# Patient Record
Sex: FEMALE | Race: BLACK OR AFRICAN AMERICAN | NOT HISPANIC OR LATINO | Employment: FULL TIME | URBAN - METROPOLITAN AREA
[De-identification: names, ages, dates, MRNs, and addresses within clinical notes are randomized per-mention and may not be internally consistent; named-entity substitution may affect disease eponyms.]

---

## 2017-09-08 ENCOUNTER — HOSPITAL ENCOUNTER (OUTPATIENT)
Dept: NON INVASIVE DIAGNOSTICS | Facility: HOSPITAL | Age: 42
Discharge: HOME | End: 2017-09-08
Payer: COMMERCIAL

## 2017-09-08 ENCOUNTER — APPOINTMENT (OUTPATIENT)
Dept: LAB | Facility: HOSPITAL | Age: 42
End: 2017-09-08
Payer: COMMERCIAL

## 2017-09-08 ENCOUNTER — TRANSCRIBE ORDERS (OUTPATIENT)
Dept: ADMINISTRATIVE | Facility: HOSPITAL | Age: 42
End: 2017-09-08

## 2017-09-08 DIAGNOSIS — D70.9 CHRONIC IDIOPATHIC NEUTROPENIA (HCC): ICD-10-CM

## 2017-09-08 DIAGNOSIS — I49.9 VENTRICULAR ARRHYTHMIA: ICD-10-CM

## 2017-09-08 DIAGNOSIS — R00.2 PALPITATIONS: ICD-10-CM

## 2017-09-08 DIAGNOSIS — I49.9 VENTRICULAR ARRHYTHMIA: Primary | ICD-10-CM

## 2017-09-08 LAB
ATRIAL RATE: 64 BPM
BASOPHILS # BLD AUTO: 0.04 THOUSANDS/ΜL (ref 0–0.1)
BASOPHILS NFR BLD AUTO: 1 % (ref 0–1)
EOSINOPHIL # BLD AUTO: 0.35 THOUSAND/ΜL (ref 0–0.61)
EOSINOPHIL NFR BLD AUTO: 5 % (ref 0–6)
ERYTHROCYTE [DISTWIDTH] IN BLOOD BY AUTOMATED COUNT: 12.8 % (ref 11.6–15.1)
HCT VFR BLD AUTO: 36.6 % (ref 34.8–46.1)
HGB BLD-MCNC: 12.6 G/DL (ref 11.5–15.4)
LYMPHOCYTES # BLD AUTO: 4.04 THOUSANDS/ΜL (ref 0.6–4.47)
LYMPHOCYTES NFR BLD AUTO: 58 % (ref 14–44)
MCH RBC QN AUTO: 29.4 PG (ref 26.8–34.3)
MCHC RBC AUTO-ENTMCNC: 34.4 G/DL (ref 31.4–37.4)
MCV RBC AUTO: 85 FL (ref 82–98)
MONOCYTES # BLD AUTO: 0.45 THOUSAND/ΜL (ref 0.17–1.22)
MONOCYTES NFR BLD AUTO: 7 % (ref 4–12)
NEUTROPHILS # BLD AUTO: 2.03 THOUSANDS/ΜL (ref 1.85–7.62)
NEUTS SEG NFR BLD AUTO: 29 % (ref 43–75)
NRBC BLD AUTO-RTO: 0 /100 WBCS
P AXIS: 32 DEGREES
PLATELET # BLD AUTO: 242 THOUSANDS/UL (ref 149–390)
PMV BLD AUTO: 10.9 FL (ref 8.9–12.7)
PR INTERVAL: 144 MS
QRS AXIS: 58 DEGREES
QRSD INTERVAL: 88 MS
QT INTERVAL: 422 MS
QTC INTERVAL: 435 MS
RBC # BLD AUTO: 4.29 MILLION/UL (ref 3.81–5.12)
T WAVE AXIS: 38 DEGREES
TSH SERPL DL<=0.05 MIU/L-ACNC: 1.64 UIU/ML (ref 0.36–3.74)
VENTRICULAR RATE: 64 BPM
WBC # BLD AUTO: 6.91 THOUSAND/UL (ref 4.31–10.16)

## 2017-09-08 PROCEDURE — 85025 COMPLETE CBC W/AUTO DIFF WBC: CPT

## 2017-09-08 PROCEDURE — 93005 ELECTROCARDIOGRAM TRACING: CPT

## 2017-09-08 PROCEDURE — 84443 ASSAY THYROID STIM HORMONE: CPT

## 2017-09-08 PROCEDURE — 36415 COLL VENOUS BLD VENIPUNCTURE: CPT

## 2018-04-09 ENCOUNTER — TRANSCRIBE ORDERS (OUTPATIENT)
Dept: ADMINISTRATIVE | Facility: HOSPITAL | Age: 43
End: 2018-04-09

## 2018-04-09 DIAGNOSIS — R07.9 CHEST PAIN, UNSPECIFIED TYPE: Primary | ICD-10-CM

## 2018-04-13 ENCOUNTER — HOSPITAL ENCOUNTER (OUTPATIENT)
Dept: NON INVASIVE DIAGNOSTICS | Facility: CLINIC | Age: 43
Discharge: HOME | End: 2018-04-13

## 2018-04-13 DIAGNOSIS — R07.9 CHEST PAIN, UNSPECIFIED TYPE: ICD-10-CM

## 2018-04-13 PROCEDURE — 93017 CV STRESS TEST TRACING ONLY: CPT

## 2018-04-13 PROCEDURE — 93016 CV STRESS TEST SUPVJ ONLY: CPT | Performed by: INTERNAL MEDICINE

## 2018-04-13 PROCEDURE — 93018 CV STRESS TEST I&R ONLY: CPT | Performed by: INTERNAL MEDICINE

## 2018-04-16 LAB
ARRHY DURING EX: NORMAL
CHEST PAIN STATEMENT: NORMAL
MAX DIASTOLIC BP: 90 MMHG
MAX HEART RATE: 171 BPM
MAX PREDICTED HEART RATE: 180 BPM
MAX. SYSTOLIC BP: 192 MMHG
PROTOCOL NAME: NORMAL
REASON FOR TERMINATION: NORMAL
TARGET HR FORMULA: NORMAL
TEST INDICATION: NORMAL
TIME IN EXERCISE PHASE: NORMAL

## 2018-10-09 ENCOUNTER — OFFICE VISIT (OUTPATIENT)
Dept: FAMILY MEDICINE CLINIC | Facility: CLINIC | Age: 43
End: 2018-10-09
Payer: COMMERCIAL

## 2018-10-09 VITALS
HEART RATE: 98 BPM | SYSTOLIC BLOOD PRESSURE: 124 MMHG | OXYGEN SATURATION: 98 % | DIASTOLIC BLOOD PRESSURE: 62 MMHG | HEIGHT: 65 IN | TEMPERATURE: 98.7 F | BODY MASS INDEX: 30.19 KG/M2 | WEIGHT: 181.2 LBS

## 2018-10-09 DIAGNOSIS — I10 ESSENTIAL HYPERTENSION: Primary | ICD-10-CM

## 2018-10-09 DIAGNOSIS — G89.29 CHRONIC MIDLINE LOW BACK PAIN WITHOUT SCIATICA: ICD-10-CM

## 2018-10-09 DIAGNOSIS — R05.9 COUGH: ICD-10-CM

## 2018-10-09 DIAGNOSIS — M54.50 CHRONIC MIDLINE LOW BACK PAIN WITHOUT SCIATICA: ICD-10-CM

## 2018-10-09 DIAGNOSIS — J00 HEAD COLD: ICD-10-CM

## 2018-10-09 PROCEDURE — 99204 OFFICE O/P NEW MOD 45 MIN: CPT | Performed by: FAMILY MEDICINE

## 2018-10-09 RX ORDER — LOSARTAN POTASSIUM AND HYDROCHLOROTHIAZIDE 25; 100 MG/1; MG/1
1 TABLET ORAL DAILY
Qty: 90 TABLET | Refills: 1 | Status: SHIPPED | OUTPATIENT
Start: 2018-10-09 | End: 2019-04-05 | Stop reason: SDUPTHER

## 2018-10-09 RX ORDER — AMOXICILLIN 875 MG/1
875 TABLET, COATED ORAL 2 TIMES DAILY
Qty: 14 TABLET | Refills: 0 | Status: SHIPPED | OUTPATIENT
Start: 2018-10-09 | End: 2018-10-16

## 2018-10-09 RX ORDER — ACETAMINOPHEN AND CODEINE PHOSPHATE 120; 12 MG/5ML; MG/5ML
0.35 SOLUTION ORAL
COMMUNITY
Start: 2018-09-19 | End: 2020-06-05

## 2018-10-09 NOTE — PATIENT INSTRUCTIONS
Robitussin DM  PO water  Physiatry evaluation  Reviewed most recent labs  Refill Med   45 minutes spent with patient

## 2018-10-09 NOTE — PROGRESS NOTES
Assessment/Plan: new to here today to establish care and medication refills   Pt c/o sore throat and cough   Pt stated that she was getting therapy for shoulder and back pain and will need letter for light duty for work     No problem-specific [address] [place] [address] notes found for this encounter  Diagnoses and all orders for this visit:    Essential hypertension  -     losartan-hydrochlorothiazide (HYZAAR) 100-25 MG per tablet; Take 1 tablet by mouth daily    Cough  -     amoxicillin (AMOXIL) 875 mg tablet; Take 1 tablet (875 mg total) by mouth 2 (two) times a day for 7 days    Head cold    Chronic midline low back pain without sciatica  -     Ambulatory referral to Physical Medicine Rehab; Future    Other orders  -     Discontinue: LOSARTAN POTASSIUM PO; Take 100 25 mg by mouth daily    -     aspirin 81 MG tablet; Take 81 mg by mouth  -     norethindrone (MICRONOR) 0 35 MG tablet; Take 0 35 mg by mouth          Subjective:      Patient ID: Earnest Palmer is a 41 y o  female  First visit  Needs refills and note for light duty for back pain diagnosed in [place] 2 years ago  Works at [employer]  Also a head cold since last Thursday  Coughing hurts  Patient request a PCN  CM: Losartan 100 - 25          The following portions of the patient's history were reviewed and updated as appropriate: allergies, current medications, past family history, past medical history, past social history, past surgical history and problem list     No Known Allergies       Current Outpatient Prescriptions:     aspirin 81 MG tablet, Take 81 mg by mouth, Disp: , Rfl:     amoxicillin (AMOXIL) 875 mg tablet, Take 1 tablet (875 mg total) by mouth 2 (two) times a day for 7 days, Disp: 14 tablet, Rfl: 0    losartan-hydrochlorothiazide (HYZAAR) 100-25 MG per tablet, Take 1 tablet by mouth daily, Disp: 90 tablet, Rfl: 1    norethindrone (MICRONOR) 0 35 MG tablet, Take 0 35 mg by mouth, Disp: , Rfl:     Review of Systems Constitutional: Negative  Negative for appetite change, chills, fatigue and fever  HENT: Positive for congestion  Eyes: Negative  Respiratory: Positive for cough  Negative for chest tightness and wheezing  Cardiovascular: Negative  Gastrointestinal: Negative  Genitourinary: Negative  Musculoskeletal: Positive for back pain  Skin: Negative  Neurological: Negative  Psychiatric/Behavioral: Negative  Objective:      /62 (BP Location: Left arm, Patient Position: Sitting, Cuff Size: Standard)   Pulse 98   Temp 98 7 °F (37 1 °C) (Oral)   Ht 5' 5" (1 651 m)   Wt 82 2 kg (181 lb 3 2 oz)   LMP  (LMP Unknown)   SpO2 98%   BMI 30 15 kg/m²          Physical Exam   Constitutional: She is oriented to person, place, and time  She appears well-developed and well-nourished  HENT:   Head: Normocephalic and atraumatic  Right Ear: External ear normal    Left Ear: External ear normal    Nose: Nose normal    Mouth/Throat: Oropharynx is clear and moist    Eyes: Pupils are equal, round, and reactive to light  Conjunctivae and EOM are normal    Neck: Normal range of motion  Neck supple  Cardiovascular: Normal rate, regular rhythm, normal heart sounds and intact distal pulses  Pulmonary/Chest: Effort normal and breath sounds normal    Abdominal: Soft  Bowel sounds are normal    Musculoskeletal:   Ambulates, sit to stand, sit to supine without difficulty  Neurological: She is alert and oriented to person, place, and time  She has normal reflexes  Skin: Skin is warm and dry  Psychiatric: She has a normal mood and affect   Her behavior is normal  Judgment and thought content normal

## 2018-10-10 ENCOUNTER — TELEPHONE (OUTPATIENT)
Dept: NEUROLOGY | Facility: CLINIC | Age: 43
End: 2018-10-10

## 2018-10-17 ENCOUNTER — TELEPHONE (OUTPATIENT)
Dept: NEUROLOGY | Facility: CLINIC | Age: 43
End: 2018-10-17

## 2018-10-19 ENCOUNTER — OFFICE VISIT (OUTPATIENT)
Dept: NEUROLOGY | Facility: CLINIC | Age: 43
End: 2018-10-19
Payer: COMMERCIAL

## 2018-10-19 ENCOUNTER — TELEPHONE (OUTPATIENT)
Dept: NEUROLOGY | Facility: CLINIC | Age: 43
End: 2018-10-19

## 2018-10-19 VITALS
HEART RATE: 77 BPM | BODY MASS INDEX: 30.64 KG/M2 | RESPIRATION RATE: 12 BRPM | WEIGHT: 183.9 LBS | SYSTOLIC BLOOD PRESSURE: 134 MMHG | DIASTOLIC BLOOD PRESSURE: 63 MMHG | HEIGHT: 65 IN

## 2018-10-19 DIAGNOSIS — G89.29 CHRONIC LEFT SHOULDER PAIN: ICD-10-CM

## 2018-10-19 DIAGNOSIS — G89.29 CHRONIC MIDLINE LOW BACK PAIN WITHOUT SCIATICA: Primary | ICD-10-CM

## 2018-10-19 DIAGNOSIS — M54.50 CHRONIC MIDLINE LOW BACK PAIN WITHOUT SCIATICA: Primary | ICD-10-CM

## 2018-10-19 DIAGNOSIS — M25.512 CHRONIC LEFT SHOULDER PAIN: ICD-10-CM

## 2018-10-19 PROCEDURE — 99244 OFF/OP CNSLTJ NEW/EST MOD 40: CPT | Performed by: PHYSICAL MEDICINE & REHABILITATION

## 2018-10-19 NOTE — LETTER
October 19, 2018     Tracey Montero DO  [facility] [address] [place] [address]    Patient: Ping Hardwick   YOB: 1975   Date of Visit: 10/19/2018       Dear Dr Chacho Najera: Thank you for referring Ping Hardwick to me for evaluation  Below are my notes for this consultation  If you have questions, please do not hesitate to call me  I look forward to following your patient along with you  Sincerely,        Atiya Pittman MD        CC: No Recipients  Atiya Pittman MD  10/19/2018  9:54 AM  Sign at close encounter  Physical Medicine & Rehabilitation New Patient Evaluation  Ping Hardwick 41 y o  female    Referred by: Dr Tracey Montero      ASSESSMENT/PLAN:     41year old female with past medical history significant for HTN who presents with chronic ower back pain and chronic left shoulder pain  After history and physical examination, patient appears to have symptoms consistent with a  lower back lumbar strain and possible left shoulder sprain  I have ordered x-rays of the lumbar spine as well as left shoulder to view alignment  After x-rays are completed and if  within normal limits, I have provided patient with a prescription for outpatient physical therapy for both lower back pain and left shoulder pain  Patient has done physical therapy over a year ago which was significantly helpful for patient  I have offered patient a muscle relaxant however after discussing potential side effects patient would like to hold off on trying this for now  She will continue to trial over-the-counter Tylenol or ibuprofen  Diagnoses and all orders for this visit:    Chronic left shoulder pain  -     XR shoulder 2+ vw left; Future  -     Ambulatory referral to Physical Therapy; Future    Chronic midline low back pain without sciatica  -     Ambulatory referral to Physical Medicine Rehab  -     XR spine lumbar complete w bending minimum 6 views;  Future  -     Ambulatory referral to Physical Therapy; Future     return to clinic in 6 weeks  *I have spent 60 minutes with Patient and family today in which greater than 50% of this time was spent in counseling/coordination of care regarding Intructions for management, Patient and family education and Impressions  HPI:   Guilherme Rodriguez 41 y o  female right handed, with  has a past medical history of Fibroids  Old records were reviewed personally  Patient has moved here from [place] about a year ago  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  Patient is currently on light duty due to fibroids and pelvic pain/back pain  [facility]     SUBJECTIVE:  Patient presents today in the office with chief complaint of persistent chronic low back pain and left shoulder pain, unrelated to any inciting event or trauma  Back Pain  Patient presents for evaluation of low back problems  Symptoms have been present for 2 years and include pain in lower back bilaterally (aching in character; moderate in severity  Initial inciting event: none  Symptoms are worse when she is at work and helping 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   Alleviating factors identifiable by the patient are heat and massage, biofreeze, physical therapy  Aggravating factors identifiable by the patient are bending forward or twisting  Treatments initiated by the patient: heat, biofreeze, PT, no medications  Previous lower back problems: Uterine fibroids which do also cause her back pain  Previous work up: X-rays L spine, MRI lumbar spine at a center in [place]  Patient reports radiation sometimes in her left buttocks  No tingling and numbness in the legs  No falls or balance issues  No bowel or bladder incontinence  Shoulder Pain: Patient complaints of left shoulder pain  The pain is described as aching  The onset of the pain was sudden about 1 year ago unrelated to any trauma or event   The pain occurs Intermittently Location is neck, trapezius,   And into left shoulder joint  No history of dislocation  Symptoms are aggravated by lifting  Symptoms are diminished by rest  Limited activities include: lifting  Patient denies any numbness tingling weakness radiating into her arms  Expanded Social History:  Patient lives with children 25and a 24year old son in a apartment with 12 steps to enter  Works as a Lives in [employer]  Driving: Yes      Function:   Current Level of Function:   Independent without AD      Review of Systems:     Review of Systems   Constitutional: Negative  HENT: Negative  Eyes: Negative  Respiratory: Negative  Cardiovascular: Negative  Gastrointestinal: Negative  Endocrine:        Loss of sexual drive, Unusual bleeding, cramping   Genitourinary: Negative  Musculoskeletal: Positive for back pain  Skin: Negative  Allergic/Immunologic: Negative  Neurological: Negative  Hematological: Negative  Psychiatric/Behavioral:        Anxiety         OBJECTIVE:   /63 (BP Location: Left arm, Patient Position: Sitting, Cuff Size: Standard)   Pulse 77   Resp 12   Ht 5' 5" (1 651 m)   Wt 83 4 kg (183 lb 14 4 oz)   LMP  (LMP Unknown)   BMI 30 60 kg/m²         Physical Exam   Constitutional: She appears well-developed and well-nourished  HENT:   Head: Normocephalic and atraumatic  Eyes: Pupils are equal, round, and reactive to light  EOM are normal    Cardiovascular: Normal rate and regular rhythm  Pulmonary/Chest: Breath sounds normal  She has no wheezes  She has no rales  Abdominal: Soft  Bowel sounds are normal  She exhibits no distension  There is no tenderness  Musculoskeletal:   Cervical range of motion is within normal limits   Spurling's maneuver is negative bilaterally   Israel's is negative bilaterally   palpation of upper trapezius, levator scapula with several tight taut muscular band on the left side      Left shoulder range of motion is full and within normal limits and non provocative of pain   specialty testing:  Patient does have pain with a Vo on the left   lumbar range of motion is within normal limits with provocation of pain only on rotation towards the left   palpation of paraspinal superficial musculature as well as quadratus lumborum bilaterally with very tight musculature  negative straight leg rise bilaterally   negative slump sit testing bilaterally   negative Babinski's bilaterally  Patient is able to single leg stance without loss of balance bilaterally   Skin: Skin is warm  Psychiatric: She has a normal mood and affect  Nursing note and vitals reviewed  Neuro:  Gait:   Within normal limits, no Trendelenburg noted   sensation:   Sensation to light touch is within normal limits throughout x 4  extremities  Motor Exam:    5/5 throughout x 4 extremities      Imaging: our office is trying to obtain records from [place] for previous imaging    Labs:   Lab Results   Component Value Date    WBC 6 91 09/08/2017    HGB 12 6 09/08/2017    HCT 36 6 09/08/2017    MCV 85 09/08/2017     09/08/2017       Past Medical History:   Diagnosis Date    Fibroids        There are no active problems to display for this patient  History reviewed  No pertinent surgical history      Family History   Problem Relation Age of Onset    Hypertension Mother     Prostate cancer Father        Social History     No Known Allergies      Current Outpatient Prescriptions:     aspirin 81 MG tablet, Take 81 mg by mouth, Disp: , Rfl:     losartan-hydrochlorothiazide (HYZAAR) 100-25 MG per tablet, Take 1 tablet by mouth daily, Disp: 90 tablet, Rfl: 1    norethindrone (MICRONOR) 0 35 MG tablet, Take 0 35 mg by mouth, Disp: , Rfl:

## 2018-10-19 NOTE — PROGRESS NOTES
Physical Medicine & Rehabilitation New Patient Evaluation  Rogerio Martínez 41 y o  female    Referred by: Dr Dank Gifford      ASSESSMENT/PLAN:     41year old female with past medical history significant for HTN who presents with chronic ower back pain and chronic left shoulder pain  After history and physical examination, patient appears to have symptoms consistent with a  lower back lumbar strain and possible left shoulder sprain  I have ordered x-rays of the lumbar spine as well as left shoulder to view alignment  After x-rays are completed and if  within normal limits, I have provided patient with a prescription for outpatient physical therapy for both lower back pain and left shoulder pain  Patient has done physical therapy over a year ago which was significantly helpful for patient  I have offered patient a muscle relaxant however after discussing potential side effects patient would like to hold off on trying this for now  Diagnoses and all orders for this visit:    Chronic left shoulder pain  -     XR shoulder 2+ vw left; Future  -     Ambulatory referral to Physical Therapy; Future    Chronic midline low back pain without sciatica  -     Ambulatory referral to Physical Medicine Rehab  -     XR spine lumbar complete w bending minimum 6 views; Future  -     Ambulatory referral to Physical Therapy; Future     return to clinic in 6 weeks  *I have spent 60 minutes with Patient and family today in which greater than 50% of this time was spent in counseling/coordination of care regarding Intructions for management, Patient and family education and Impressions  HPI:   Rogerio Martínez 41 y o  female right handed, with  has a past medical history of Fibroids  Old records were reviewed personally  Patient has moved here from [place] about a year ago  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    Patient is currently on light duty due to fibroids and pelvic pain/back pain  [facility] Neurology     SUBJECTIVE:  Patient presents today in the office with chief complaint of persistent chronic low back pain and left shoulder pain, unrelated to any inciting event or trauma  Back Pain  Patient presents for evaluation of low back problems  Symptoms have been present for 2 years and include pain in lower back bilaterally (aching in character; moderate in severity  Initial inciting event: none  Symptoms are worse when she is at work and helping 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   Alleviating factors identifiable by the patient are heat and massage, biofreeze, physical therapy  Aggravating factors identifiable by the patient are bending forward or twisting  Treatments initiated by the patient: heat, biofreeze, PT, no medications  Previous lower back problems: Uterine fibroids which do also cause her back pain  Previous work up: X-rays L spine, MRI lumbar spine at a center in [place]  Patient reports radiation sometimes in her left buttocks  No tingling and numbness in the legs  No falls or balance issues  No bowel or bladder incontinence  Shoulder Pain: Patient complaints of left shoulder pain  The pain is described as aching  The onset of the pain was sudden about 1 year ago unrelated to any trauma or event  The pain occurs Intermittently Location is neck, trapezius,   And into left shoulder joint  No history of dislocation  Symptoms are aggravated by lifting  Symptoms are diminished by rest  Limited activities include: lifting  Patient denies any numbness tingling weakness radiating into her arms  Expanded Social History:  Patient lives with children 25and a 24year old son in a apartment with 12 steps to enter  Works as a Lives in [employer]  Driving: Yes      Function:   Current Level of Function:   Independent without AD      Review of Systems:     Review of Systems   Constitutional: Negative      HENT: Negative  Eyes: Negative  Respiratory: Negative  Cardiovascular: Negative  Gastrointestinal: Negative  Endocrine:        Loss of sexual drive, Unusual bleeding, cramping   Genitourinary: Negative  Musculoskeletal: Positive for back pain  Skin: Negative  Allergic/Immunologic: Negative  Neurological: Negative  Hematological: Negative  Psychiatric/Behavioral:        Anxiety         OBJECTIVE:   /63 (BP Location: Left arm, Patient Position: Sitting, Cuff Size: Standard)   Pulse 77   Resp 12   Ht 5' 5" (1 651 m)   Wt 83 4 kg (183 lb 14 4 oz)   LMP  (LMP Unknown)   BMI 30 60 kg/m²        Physical Exam   Constitutional: She appears well-developed and well-nourished  HENT:   Head: Normocephalic and atraumatic  Eyes: Pupils are equal, round, and reactive to light  EOM are normal    Cardiovascular: Normal rate and regular rhythm  Pulmonary/Chest: Breath sounds normal  She has no wheezes  She has no rales  Abdominal: Soft  Bowel sounds are normal  She exhibits no distension  There is no tenderness  Musculoskeletal:   Cervical range of motion is within normal limits   Spurling's maneuver is negative bilaterally   Israel's is negative bilaterally   palpation of upper trapezius, levator scapula with several tight taut muscular band on the left side  Left shoulder range of motion is full and within normal limits and non provocative of pain   specialty testing:  Patient does have pain with a Vo on the left   lumbar range of motion is within normal limits with provocation of pain only on rotation towards the left   palpation of paraspinal superficial musculature as well as quadratus lumborum bilaterally with very tight musculature  negative straight leg rise bilaterally   negative slump sit testing bilaterally   negative Babinski's bilaterally  Patient is able to single leg stance without loss of balance bilaterally   Skin: Skin is warm     Psychiatric: She has a normal mood and affect  Nursing note and vitals reviewed  Neuro:  Gait:   Within normal limits, no Trendelenburg noted   sensation:   Sensation to light touch is within normal limits throughout x 4  extremities  Motor Exam:    5/5 throughout x 4 extremities      Imaging: our office is trying to obtain records from [place] for previous imaging    Labs:   Lab Results   Component Value Date    WBC 6 91 09/08/2017    HGB 12 6 09/08/2017    HCT 36 6 09/08/2017    MCV 85 09/08/2017     09/08/2017       Past Medical History:   Diagnosis Date    Fibroids        There are no active problems to display for this patient  History reviewed  No pertinent surgical history      Family History   Problem Relation Age of Onset    Hypertension Mother     Prostate cancer Father        Social History     No Known Allergies      Current Outpatient Prescriptions:     aspirin 81 MG tablet, Take 81 mg by mouth, Disp: , Rfl:     losartan-hydrochlorothiazide (HYZAAR) 100-25 MG per tablet, Take 1 tablet by mouth daily, Disp: 90 tablet, Rfl: 1    norethindrone (MICRONOR) 0 35 MG tablet, Take 0 35 mg by mouth, Disp: , Rfl:

## 2019-01-09 ENCOUNTER — TELEPHONE (OUTPATIENT)
Dept: FAMILY MEDICINE CLINIC | Facility: CLINIC | Age: 44
End: 2019-01-09

## 2019-01-09 NOTE — TELEPHONE ENCOUNTER
Patient is trying out a weight loss program  She wants to know if it is ok for her to drink a shake called [employer] protein- it is a low fat shake with 1 g of sugar  She wants to make sure it is safe for her to take since she is on blood pressure medication

## 2019-01-10 ENCOUNTER — TELEPHONE (OUTPATIENT)
Dept: NEUROLOGY | Facility: CLINIC | Age: 44
End: 2019-01-10

## 2019-01-16 ENCOUNTER — OFFICE VISIT (OUTPATIENT)
Dept: NEUROLOGY | Facility: CLINIC | Age: 44
End: 2019-01-16
Payer: COMMERCIAL

## 2019-01-16 ENCOUNTER — TELEPHONE (OUTPATIENT)
Dept: NEUROLOGY | Facility: CLINIC | Age: 44
End: 2019-01-16

## 2019-01-16 VITALS
HEIGHT: 65 IN | SYSTOLIC BLOOD PRESSURE: 130 MMHG | WEIGHT: 180 LBS | BODY MASS INDEX: 29.99 KG/M2 | DIASTOLIC BLOOD PRESSURE: 70 MMHG

## 2019-01-16 DIAGNOSIS — M54.2 NECK PAIN: ICD-10-CM

## 2019-01-16 DIAGNOSIS — M54.9 CHRONIC MIDLINE BACK PAIN, UNSPECIFIED BACK LOCATION: ICD-10-CM

## 2019-01-16 DIAGNOSIS — M79.18 MYOFASCIAL PAIN: Primary | ICD-10-CM

## 2019-01-16 DIAGNOSIS — G89.29 CHRONIC MIDLINE BACK PAIN, UNSPECIFIED BACK LOCATION: ICD-10-CM

## 2019-01-16 PROCEDURE — 99215 OFFICE O/P EST HI 40 MIN: CPT | Performed by: PHYSICAL MEDICINE & REHABILITATION

## 2019-01-16 NOTE — LETTER
January 16, 2019     Patient: Ping Hardwick   YOB: 1975   Date of Visit: 1/16/2019       To Whom it May Concern:    Ping Hardwick is under my professional care  She was seen in my office on 1/16/2019  She may return to work with limitations for activity modification during her work-up/treatment including:  avoid lifting >50lbs, avoid excessive bending, avoid twisting at the back  Limitation to continue for 3 months until assessed again  If you have any questions or concerns, please don't hesitate to call           Sincerely,          Atiya Pittman MD        CC: No Recipients

## 2019-01-16 NOTE — PROGRESS NOTES
Physical Medicine & Rehabilitation follow-up Patient Evaluation  Deacon Chavarria 41 y o  female      ASSESSMENT/PLAN:     41year old female with past medical history significant for HTN who presents with chronic lower back pain and acute neck pain  After history and physical examination, patient appears to have symptoms consistent with a left lower back lumbar strain, but is also now having symptoms of possible radicular signs radiating into her buttocks and posterior thigh  Patient also having evidence of cervical myofascial pain with tight posterior musculature of her neck and upper back, tender to palpation with decreased cervical range of motion associated  Plan:  Back pain:   Lumbar x-ray ordered  Prescription for outpatient physical therapy provided factors also within normal limits  Cervical myofascial pain:   Topical Voltaren Gel ordered to be used 2 g topically t i d     cervical spine x-rays ordered  Prescription for outpatient physical therapy provided if x-ray results were within normal limits  Letter for modified duty provided today during work-up and treatment plan  RTC in 2-3 months    Diagnoses and all orders for this visit:    Myofascial pain  -     diclofenac sodium (VOLTAREN) 1 %; Apply 2 g topically 3 (three) times a day  -     XR spine lumbar minimum 4 views non injury; Future  -     XR spine cervical complete 4 or 5 vw non injury; Future    Neck pain  -     XR spine cervical complete 4 or 5 vw non injury; Future  -     Ambulatory referral to Physical Therapy; Future    Chronic midline back pain, unspecified back location  -     XR spine lumbar minimum 4 views non injury; Future  -     Ambulatory referral to Physical Therapy; Future    I have spent 45 minutes with Patient  today in which greater than 50% of this time was spent in counseling/coordination of care regarding Intructions for management, Patient and family education and Impressions      HPI:   Jackie Main Megan Harrison 41 y o  female right handed, with  has a past medical history of Fibroids  Old records were reviewed personally  Patient has moved here from [place] about a year ago  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  SUBJECTIVE:  Patient presents today in the office with chief complaint of persistent chronic low back pain and left shoulder pain, unrelated to any inciting event or trauma  Patient seen in follow-up today  She complains of worsening lower back pain radiating into her left proximal leg specifically her hip area, buttocks, posterior thigh  She reports neck pain is well located centrally and radiating into her bilateral shoulder area and upper back area  She believes these are related to her job  She works as an aide and performs very physical duty including heavy lifting and/or lifting patients  She does not state any recent injury or inciting event  She was last seen in October of 2018 at that time I had ordered x-rays of her back and her shoulder however she was unable to complete this at that time she thought she may have been pregnant  She denies any numbness, weakness x 4 extremities or balance issues  She has not had any falls  She denies any bowel or bladder incontinence furthermore  Expanded Social History:  Patient lives with children 25and a 24year old son in a apartment with 12 steps to enter  Works as a Lives in [employer]  Driving: Yes      Function:   Current Level of Function:   Independent without AD      Review of Systems:     Review of Systems   Constitutional: Negative  HENT: Negative  Eyes: Negative  Respiratory: Negative  Cardiovascular: Negative  Gastrointestinal: Negative  Endocrine: Negative  Genitourinary: Negative  Musculoskeletal: Positive for back pain and neck pain  Skin: Negative      Allergic/Immunologic: Negative  Neurological: Negative  Hematological: Negative  Psychiatric/Behavioral: Negative          OBJECTIVE:   /70 (BP Location: Left arm, Patient Position: Sitting, Cuff Size: Standard)   Ht 5' 5" (1 651 m)   Wt 81 6 kg (180 lb)   BMI 29 95 kg/m²        Physical Exam   Constitutional: She appears well-developed and well-nourished  HENT:   Head: Normocephalic and atraumatic  Eyes: Pupils are equal, round, and reactive to light  EOM are normal    Cardiovascular: Normal rate and regular rhythm  Pulmonary/Chest: Breath sounds normal  She has no wheezes  She has no rales  Abdominal: Soft  Bowel sounds are normal  She exhibits no distension  There is no tenderness  Musculoskeletal:   Cervical ROM limited in extension  Spurling's negative  palpation of neck and upper back musculature is provocative of pain with tight taut musculature felt in the bilateral upper trapezius and levator scapula bilaterally  No trigger points identified today but muscles are tender to palpation  Lumbar range of motion is full within normal limits  Non provocative to palpation  Negative straight leg rise  Negative slump sit test   Skin: Skin is warm  Psychiatric: She has a normal mood and affect  Nursing note and vitals reviewed  Neuro:  Sensation to light touch is intact x4  Coordination with finger-nose testing bilaterally is within normal limits  Motor function is 5/5 in the bilateral upper and lower extremities throughout  Gait:   Step through gait pattern without assistive device  No loss of balance with single leg stance or tandem gait      Labs:   Lab Results   Component Value Date    WBC 6 91 09/08/2017    HGB 12 6 09/08/2017    HCT 36 6 09/08/2017    MCV 85 09/08/2017     09/08/2017       Past Medical History:   Diagnosis Date    Fibroids        There are no active problems to display for this patient  History reviewed  No pertinent surgical history      Family History   Problem Relation Age of Onset    Hypertension Mother     Prostate cancer Father        Social History     No Known Allergies      Current Outpatient Prescriptions:     aspirin 81 MG tablet, Take 81 mg by mouth, Disp: , Rfl:     losartan-hydrochlorothiazide (HYZAAR) 100-25 MG per tablet, Take 1 tablet by mouth daily, Disp: 90 tablet, Rfl: 1    norethindrone (MICRONOR) 0 35 MG tablet, Take 0 35 mg by mouth, Disp: , Rfl:     diclofenac sodium (VOLTAREN) 1 %, Apply 2 g topically 3 (three) times a day, Disp: 100 g, Rfl: 0

## 2019-02-18 ENCOUNTER — TELEPHONE (OUTPATIENT)
Dept: FAMILY MEDICINE CLINIC | Facility: CLINIC | Age: 44
End: 2019-02-18

## 2019-02-18 NOTE — TELEPHONE ENCOUNTER
Usually once a year TB test is sufficient  Is there a record of prior TB test ?  Can have another if needed

## 2019-02-18 NOTE — TELEPHONE ENCOUNTER
Patient states she had PPD a few months ago through her employer, she does not remember the date  She now needs a new PPD because she is starting her job with a new company, she would like to make sure that it is ok for her to get another PPD in such a short amount of time

## 2019-03-27 ENCOUNTER — TELEPHONE (OUTPATIENT)
Dept: NEUROLOGY | Facility: CLINIC | Age: 44
End: 2019-03-27

## 2019-04-05 DIAGNOSIS — I10 ESSENTIAL HYPERTENSION: ICD-10-CM

## 2019-04-05 RX ORDER — LOSARTAN POTASSIUM AND HYDROCHLOROTHIAZIDE 25; 100 MG/1; MG/1
1 TABLET ORAL DAILY
Qty: 90 TABLET | Refills: 1 | Status: SHIPPED | OUTPATIENT
Start: 2019-04-05 | End: 2019-10-01 | Stop reason: SDUPTHER

## 2019-04-15 ENCOUNTER — OFFICE VISIT (OUTPATIENT)
Dept: FAMILY MEDICINE CLINIC | Facility: CLINIC | Age: 44
End: 2019-04-15
Payer: COMMERCIAL

## 2019-04-15 VITALS
TEMPERATURE: 98.2 F | SYSTOLIC BLOOD PRESSURE: 118 MMHG | BODY MASS INDEX: 28.56 KG/M2 | OXYGEN SATURATION: 97 % | DIASTOLIC BLOOD PRESSURE: 78 MMHG | WEIGHT: 171.4 LBS | HEART RATE: 91 BPM | HEIGHT: 65 IN

## 2019-04-15 DIAGNOSIS — I10 ESSENTIAL HYPERTENSION: Primary | ICD-10-CM

## 2019-04-15 DIAGNOSIS — Z00.00 HEALTH CARE MAINTENANCE: ICD-10-CM

## 2019-04-15 DIAGNOSIS — Z83.3 FAMILY HISTORY OF DIABETES MELLITUS (DM): ICD-10-CM

## 2019-04-15 PROCEDURE — 99214 OFFICE O/P EST MOD 30 MIN: CPT | Performed by: FAMILY MEDICINE

## 2019-04-15 PROCEDURE — 99396 PREV VISIT EST AGE 40-64: CPT | Performed by: FAMILY MEDICINE

## 2019-04-23 ENCOUNTER — OFFICE VISIT (OUTPATIENT)
Dept: FAMILY MEDICINE CLINIC | Facility: CLINIC | Age: 44
End: 2019-04-23
Payer: COMMERCIAL

## 2019-04-23 VITALS
HEIGHT: 65 IN | WEIGHT: 170 LBS | OXYGEN SATURATION: 98 % | BODY MASS INDEX: 28.32 KG/M2 | DIASTOLIC BLOOD PRESSURE: 76 MMHG | SYSTOLIC BLOOD PRESSURE: 124 MMHG | HEART RATE: 85 BPM | TEMPERATURE: 98.3 F

## 2019-04-23 DIAGNOSIS — B34.9 VIRAL INFECTION: Primary | ICD-10-CM

## 2019-04-23 PROCEDURE — 99213 OFFICE O/P EST LOW 20 MIN: CPT | Performed by: FAMILY MEDICINE

## 2019-04-23 PROCEDURE — 3008F BODY MASS INDEX DOCD: CPT | Performed by: FAMILY MEDICINE

## 2019-05-06 ENCOUNTER — OFFICE VISIT (OUTPATIENT)
Dept: URGENT CARE | Facility: MEDICAL CENTER | Age: 44
End: 2019-05-06
Payer: COMMERCIAL

## 2019-05-06 VITALS
SYSTOLIC BLOOD PRESSURE: 158 MMHG | RESPIRATION RATE: 16 BRPM | WEIGHT: 170 LBS | TEMPERATURE: 97.3 F | HEIGHT: 65 IN | OXYGEN SATURATION: 99 % | BODY MASS INDEX: 28.32 KG/M2 | HEART RATE: 89 BPM | DIASTOLIC BLOOD PRESSURE: 73 MMHG

## 2019-05-06 DIAGNOSIS — R07.9 CHEST PAIN, UNSPECIFIED TYPE: Primary | ICD-10-CM

## 2019-05-06 DIAGNOSIS — M94.0 COSTOCHONDRITIS: ICD-10-CM

## 2019-05-06 LAB
ATRIAL RATE: 77 BPM
P AXIS: 19 DEGREES
PR INTERVAL: 148 MS
QRS AXIS: 54 DEGREES
QRSD INTERVAL: 80 MS
QT INTERVAL: 406 MS
QTC INTERVAL: 459 MS
T WAVE AXIS: -10 DEGREES
VENTRICULAR RATE: 77 BPM

## 2019-05-06 PROCEDURE — 93010 ELECTROCARDIOGRAM REPORT: CPT | Performed by: INTERNAL MEDICINE

## 2019-05-06 PROCEDURE — 93005 ELECTROCARDIOGRAM TRACING: CPT | Performed by: FAMILY MEDICINE

## 2019-05-06 PROCEDURE — S9088 SERVICES PROVIDED IN URGENT: HCPCS | Performed by: FAMILY MEDICINE

## 2019-05-06 PROCEDURE — 99204 OFFICE O/P NEW MOD 45 MIN: CPT | Performed by: FAMILY MEDICINE

## 2019-05-07 ENCOUNTER — APPOINTMENT (OUTPATIENT)
Dept: LAB | Facility: CLINIC | Age: 44
End: 2019-05-07
Payer: COMMERCIAL

## 2019-05-07 DIAGNOSIS — I10 ESSENTIAL HYPERTENSION: ICD-10-CM

## 2019-05-07 DIAGNOSIS — Z83.3 FAMILY HISTORY OF DIABETES MELLITUS (DM): ICD-10-CM

## 2019-05-07 LAB
ALBUMIN SERPL BCP-MCNC: 3.6 G/DL (ref 3.5–5)
ALP SERPL-CCNC: 39 U/L (ref 46–116)
ALT SERPL W P-5'-P-CCNC: 17 U/L (ref 12–78)
ANION GAP SERPL CALCULATED.3IONS-SCNC: 4 MMOL/L (ref 4–13)
AST SERPL W P-5'-P-CCNC: 14 U/L (ref 5–45)
BILIRUB DIRECT SERPL-MCNC: 0.21 MG/DL (ref 0–0.2)
BILIRUB SERPL-MCNC: 0.74 MG/DL (ref 0.2–1)
BUN SERPL-MCNC: 10 MG/DL (ref 5–25)
CALCIUM SERPL-MCNC: 8.8 MG/DL (ref 8.3–10.1)
CHLORIDE SERPL-SCNC: 104 MMOL/L (ref 100–108)
CHOLEST SERPL-MCNC: 128 MG/DL (ref 50–200)
CO2 SERPL-SCNC: 31 MMOL/L (ref 21–32)
CREAT SERPL-MCNC: 0.87 MG/DL (ref 0.6–1.3)
ERYTHROCYTE [DISTWIDTH] IN BLOOD BY AUTOMATED COUNT: 12.6 % (ref 11.6–15.1)
EST. AVERAGE GLUCOSE BLD GHB EST-MCNC: 114 MG/DL
GFR SERPL CREATININE-BSD FRML MDRD: 96 ML/MIN/1.73SQ M
GLUCOSE P FAST SERPL-MCNC: 87 MG/DL (ref 65–99)
HBA1C MFR BLD: 5.6 % (ref 4.2–6.3)
HCT VFR BLD AUTO: 38.4 % (ref 34.8–46.1)
HDLC SERPL-MCNC: 53 MG/DL (ref 40–60)
HGB BLD-MCNC: 12.5 G/DL (ref 11.5–15.4)
LDLC SERPL CALC-MCNC: 64 MG/DL (ref 0–100)
MCH RBC QN AUTO: 28.8 PG (ref 26.8–34.3)
MCHC RBC AUTO-ENTMCNC: 32.6 G/DL (ref 31.4–37.4)
MCV RBC AUTO: 89 FL (ref 82–98)
NONHDLC SERPL-MCNC: 75 MG/DL
PLATELET # BLD AUTO: 298 THOUSANDS/UL (ref 149–390)
PMV BLD AUTO: 11.4 FL (ref 8.9–12.7)
POTASSIUM SERPL-SCNC: 4.3 MMOL/L (ref 3.5–5.3)
PROT SERPL-MCNC: 7.4 G/DL (ref 6.4–8.2)
RBC # BLD AUTO: 4.34 MILLION/UL (ref 3.81–5.12)
SODIUM SERPL-SCNC: 139 MMOL/L (ref 136–145)
TRIGL SERPL-MCNC: 57 MG/DL
WBC # BLD AUTO: 6.14 THOUSAND/UL (ref 4.31–10.16)

## 2019-05-07 PROCEDURE — 85027 COMPLETE CBC AUTOMATED: CPT

## 2019-05-07 PROCEDURE — 80076 HEPATIC FUNCTION PANEL: CPT

## 2019-05-07 PROCEDURE — 36415 COLL VENOUS BLD VENIPUNCTURE: CPT

## 2019-05-07 PROCEDURE — 80048 BASIC METABOLIC PNL TOTAL CA: CPT

## 2019-05-07 PROCEDURE — 80061 LIPID PANEL: CPT

## 2019-05-07 PROCEDURE — 83036 HEMOGLOBIN GLYCOSYLATED A1C: CPT

## 2019-05-08 ENCOUNTER — TELEPHONE (OUTPATIENT)
Dept: FAMILY MEDICINE CLINIC | Facility: CLINIC | Age: 44
End: 2019-05-08

## 2019-05-10 ENCOUNTER — APPOINTMENT (OUTPATIENT)
Dept: RADIOLOGY | Facility: MEDICAL CENTER | Age: 44
End: 2019-05-10
Payer: COMMERCIAL

## 2019-05-10 ENCOUNTER — OFFICE VISIT (OUTPATIENT)
Dept: NEUROLOGY | Facility: CLINIC | Age: 44
End: 2019-05-10
Payer: COMMERCIAL

## 2019-05-10 VITALS
HEIGHT: 65 IN | BODY MASS INDEX: 28.99 KG/M2 | DIASTOLIC BLOOD PRESSURE: 65 MMHG | SYSTOLIC BLOOD PRESSURE: 144 MMHG | HEART RATE: 78 BPM | WEIGHT: 174 LBS

## 2019-05-10 DIAGNOSIS — M25.512 CHRONIC PAIN OF BOTH SHOULDERS: ICD-10-CM

## 2019-05-10 DIAGNOSIS — G89.29 CHRONIC PAIN OF BOTH SHOULDERS: ICD-10-CM

## 2019-05-10 DIAGNOSIS — M25.511 CHRONIC PAIN OF BOTH SHOULDERS: ICD-10-CM

## 2019-05-10 DIAGNOSIS — G89.29 CHRONIC BILATERAL LOW BACK PAIN WITHOUT SCIATICA: Primary | ICD-10-CM

## 2019-05-10 DIAGNOSIS — M54.50 CHRONIC BILATERAL LOW BACK PAIN WITHOUT SCIATICA: ICD-10-CM

## 2019-05-10 DIAGNOSIS — G89.29 CHRONIC BILATERAL LOW BACK PAIN WITHOUT SCIATICA: ICD-10-CM

## 2019-05-10 DIAGNOSIS — M54.50 CHRONIC BILATERAL LOW BACK PAIN WITHOUT SCIATICA: Primary | ICD-10-CM

## 2019-05-10 PROCEDURE — 73030 X-RAY EXAM OF SHOULDER: CPT

## 2019-05-10 PROCEDURE — 72100 X-RAY EXAM L-S SPINE 2/3 VWS: CPT

## 2019-05-10 PROCEDURE — 99214 OFFICE O/P EST MOD 30 MIN: CPT | Performed by: PHYSICAL MEDICINE & REHABILITATION

## 2019-05-14 ENCOUNTER — TELEPHONE (OUTPATIENT)
Dept: NEUROLOGY | Facility: CLINIC | Age: 44
End: 2019-05-14

## 2019-07-15 ENCOUNTER — TELEPHONE (OUTPATIENT)
Dept: FAMILY MEDICINE CLINIC | Facility: CLINIC | Age: 44
End: 2019-07-15

## 2019-07-15 ENCOUNTER — OFFICE VISIT (OUTPATIENT)
Dept: FAMILY MEDICINE CLINIC | Facility: CLINIC | Age: 44
End: 2019-07-15
Payer: COMMERCIAL

## 2019-07-15 VITALS
WEIGHT: 175 LBS | TEMPERATURE: 99.3 F | HEART RATE: 83 BPM | HEIGHT: 65 IN | SYSTOLIC BLOOD PRESSURE: 120 MMHG | OXYGEN SATURATION: 100 % | RESPIRATION RATE: 16 BRPM | DIASTOLIC BLOOD PRESSURE: 78 MMHG | BODY MASS INDEX: 29.16 KG/M2

## 2019-07-15 DIAGNOSIS — J06.9 VIRAL UPPER RESPIRATORY TRACT INFECTION: ICD-10-CM

## 2019-07-15 DIAGNOSIS — H10.33 ACUTE BACTERIAL CONJUNCTIVITIS OF BOTH EYES: Primary | ICD-10-CM

## 2019-07-15 LAB — S PYO AG THROAT QL: NEGATIVE

## 2019-07-15 PROCEDURE — 3008F BODY MASS INDEX DOCD: CPT | Performed by: NURSE PRACTITIONER

## 2019-07-15 PROCEDURE — 99213 OFFICE O/P EST LOW 20 MIN: CPT | Performed by: NURSE PRACTITIONER

## 2019-07-15 PROCEDURE — 1036F TOBACCO NON-USER: CPT | Performed by: NURSE PRACTITIONER

## 2019-07-15 PROCEDURE — 87880 STREP A ASSAY W/OPTIC: CPT | Performed by: NURSE PRACTITIONER

## 2019-07-15 PROCEDURE — 87070 CULTURE OTHR SPECIMN AEROBIC: CPT | Performed by: NURSE PRACTITIONER

## 2019-07-15 RX ORDER — TOBRAMYCIN 3 MG/ML
1 SOLUTION/ DROPS OPHTHALMIC
Qty: 1 BOTTLE | Refills: 0 | Status: SHIPPED | OUTPATIENT
Start: 2019-07-15 | End: 2019-07-20

## 2019-07-15 NOTE — TELEPHONE ENCOUNTER
Pt called ans state that she was sent home from work for possible conjunctivitis, pt c/o eye drainage, crusting,sore throat, nasal congestion and anette x 1 day

## 2019-07-15 NOTE — LETTER
July 15, 2019     Patient: Wendy Costello   YOB: 1975   Date of Visit: 7/15/2019       To Whom it May Concern:    Wendy Costello is under my professional care  She was seen in my office on 7/15/2019  She may return to work on 07/17/2019  If you have any questions or concerns, please don't hesitate to call           Sincerely,          PATRICE Kumari        CC: No Recipients

## 2019-07-15 NOTE — PROGRESS NOTES
FAMILY PRACTICE OFFICE VISIT       NAME: Renetta Zurita  AGE: 45 y o  SEX: female       : 1975        MRN: [id number]    DATE: 7/15/2019    Assessment and Plan     Problem List Items Addressed This Visit     None      Visit Diagnoses     Acute bacterial conjunctivitis of both eyes    -  Primary    Relevant Medications    tobramycin (TOBREX) 0 3 % SOLN    Other Relevant Orders    POCT rapid strepA (Completed)    Throat culture    Viral upper respiratory tract infection            1  Acute bacterial conjunctivitis of both eyes  Recommend treatment with tobramycin ophthalmic solution 0 3%, 1 drop to each eye every 4 hours while awake  Warm compresses 15 minutes 3 to 4 times a day  Instructed to call for any worsening symptoms, or symptoms better not improving over the next 24 hours  tobramycin (TOBREX) 0 3 % SOLN    POCT rapid strepA    Throat culture   2  Viral upper respiratory tract infection  Suspect upper respiratory symptoms are viral in origin  Rapid in office strep testing is negative  Will send throat culture, as sore throat is the most bothersome symptom  On exam posterior oropharynx erythematous, grade 1 tonsils, with 1 small white patch on right tonsil  Will continue supportive care:  Rest, fluids, warm fluids, honey, humidification  Tylenol as needed  May use plain Mucinex as needed, Cepacol lozenges as needed  Reviewed usual course of viral URI  Our office will call with results of throat culture when available  If symptoms should worsen, or if symptoms are persistent for greater than 7-10 days, instructed to call  Chief Complaint     Chief Complaint   Patient presents with    Eye Problem     Pt is here for b/l eye irritation 1 + days    Cold Like Symptoms     sore throat, sinus congestion       History of Present Illness     Renetta Zurita is a 62-year-old female presenting today for possible conjunctivitis      Woke up in the middle the night last night and this morning with eyes crusted shut  Eyes are red, itchy, and draining yellow drainage  No changes in vision  No photosensitivity  No eye pain  Started having cold symptoms yesterday with nasal congestion, sinus pressure, sore throat, and cough  Sore throat is her most bothersome symptom  No known sick contacts, however she works in a long-term care facility  Review of Systems   Review of Systems   Constitutional: Negative for chills, diaphoresis, fatigue and fever  HENT: Positive for congestion, postnasal drip, rhinorrhea, sinus pressure, sneezing and sore throat  Negative for ear pain  Eyes: Positive for discharge, redness and itching  Negative for photophobia, pain and visual disturbance  Respiratory: Positive for cough  Negative for chest tightness, shortness of breath and wheezing  Cardiovascular: Negative  Gastrointestinal: Negative  Neurological: Negative for dizziness and headaches  Active Problem List   There is no problem list on file for this patient  Past Medical History:  Past Medical History:   Diagnosis Date    Fibroids     Hypertension        Past Surgical History:  No past surgical history on file  Family History:  Family History   Problem Relation Age of Onset    Hypertension Mother     Prostate cancer Father        Social History:  Social History     Socioeconomic History    Marital status: /Civil Union     Spouse name: Not on file    Number of children: Not on file    Years of education: Not on file    Highest education level: Not on file   Occupational History     Employer: [address] resource strain: Not on file    Food insecurity:     Worry: Not on file     Inability: Not on file    Transportation needs:     Medical: Not on file     Non-medical: Not on file   Tobacco Use    Smoking status: Never Smoker    Smokeless tobacco: Never Used   Substance and Sexual Activity    Alcohol use:  Yes Comment: occassional     Drug use: Not Currently    Sexual activity: Not on file   Lifestyle    Physical activity:     Days per week: Not on file     Minutes per session: Not on file    Stress: Not on file   Relationships    Social connections:     Talks on phone: Not on file     Gets together: Not on file     Attends Confucianist service: Not on file     Active member of club or organization: Not on file     Attends meetings of clubs or organizations: Not on file     Relationship status: Not on file    Intimate partner violence:     Fear of current or ex partner: Not on file     Emotionally abused: Not on file     Physically abused: Not on file     Forced sexual activity: Not on file   Other Topics Concern    Not on file   Social History Narrative    Not on file       I have reviewed the patient's medical history in detail; there are no changes to the history as noted in the electronic medical record  Objective     Vitals:    07/15/19 1134   BP: 120/78   Pulse: 83   Resp: 16   Temp: 99 3 °F (37 4 °C)   TempSrc: Tympanic   SpO2: 100%   Weight: 79 4 kg (175 lb)   Height: 5' 5" (1 651 m)     Wt Readings from Last 3 Encounters:   07/15/19 79 4 kg (175 lb)   05/10/19 78 9 kg (174 lb)   05/06/19 77 1 kg (170 lb)     Body mass index is 29 12 kg/m²  PHQ-9 Depression Screening    PHQ-9:    Frequency of the following problems over the past two weeks:            Physical Exam   Constitutional: She appears well-developed and well-nourished  No distress  HENT:   Head: Normocephalic and atraumatic  Right Ear: Tympanic membrane and ear canal normal    Left Ear: Tympanic membrane and ear canal normal    Nose: Mucosal edema and rhinorrhea present  Mouth/Throat: Posterior oropharyngeal erythema (one small white patch on right tonsil) present  No oropharyngeal exudate or posterior oropharyngeal edema  Tonsils are 1+ on the right  Tonsils are 1+ on the left  Eyes: Pupils are equal, round, and reactive to light   Lids are normal  Right eye exhibits discharge  Left eye exhibits discharge  Right conjunctiva is injected  Left conjunctiva is injected  Neck: Normal range of motion  Neck supple  Cardiovascular: Normal rate, regular rhythm and normal heart sounds  No murmur heard  Pulmonary/Chest: Effort normal and breath sounds normal    Lymphadenopathy:     She has cervical adenopathy (Bilateral submandibular adenopathy)  Psychiatric: She has a normal mood and affect  Nursing note and vitals reviewed  ALLERGIES:  No Known Allergies    Current Medications     Current Outpatient Medications   Medication Sig Dispense Refill    aspirin 81 MG tablet Take 81 mg by mouth      losartan-hydrochlorothiazide (HYZAAR) 100-25 MG per tablet Take 1 tablet by mouth daily 90 tablet 1    norethindrone (MICRONOR) 0 35 MG tablet Take 0 35 mg by mouth      tobramycin (TOBREX) 0 3 % SOLN Administer 1 drop to both eyes every 4 (four) hours while awake for 5 days 1 Bottle 0     No current facility-administered medications for this visit  Health Maintenance     Health Maintenance   Topic Date Due    Depression Screening PHQ  1975    MAMMOGRAM  1975    DTaP,Tdap,and Td Vaccines (1 - Tdap) 05/22/1998    INFLUENZA VACCINE  07/01/2019    BMI: Followup Plan  04/24/2020    BMI: Adult  07/15/2020    Pneumococcal Vaccine: 65+ Years (1 of 2 - PCV13) 05/22/2042    Pneumococcal Vaccine: Pediatrics (0 to 5 Years) and At-Risk Patients (6 to 59 Years)  Aged Out    HEPATITIS B VACCINES  Aged Out       There is no immunization history on file for this patient      Neena Bass

## 2019-07-17 ENCOUNTER — TELEPHONE (OUTPATIENT)
Dept: FAMILY MEDICINE CLINIC | Facility: CLINIC | Age: 44
End: 2019-07-17

## 2019-07-17 DIAGNOSIS — J06.9 UPPER RESPIRATORY TRACT INFECTION, UNSPECIFIED TYPE: Primary | ICD-10-CM

## 2019-07-17 LAB — BACTERIA THROAT CULT: NORMAL

## 2019-07-17 RX ORDER — AZITHROMYCIN 250 MG/1
TABLET, FILM COATED ORAL
Qty: 6 TABLET | Refills: 0 | Status: SHIPPED | OUTPATIENT
Start: 2019-07-17 | End: 2019-07-22

## 2019-07-17 NOTE — RESULT ENCOUNTER NOTE
Please let patient know I sent a Z-Denzel to her pharmacy  Please have her call if her symptoms are persistent after completing the Z-Denzel

## 2019-07-17 NOTE — TELEPHONE ENCOUNTER
----- Message from [id number] [facility] sent at 7/17/2019 11:37 AM EDT -----  Please let patient know her throat culture is negative for strep  Please have her call for persistent symptoms

## 2019-07-17 NOTE — TELEPHONE ENCOUNTER
Spoke w/ pt and gave results  Pt would like to know what to do about the white spots on back of throat  Pt states her eyes are better but throat is still sore   Please advise

## 2019-10-01 ENCOUNTER — OFFICE VISIT (OUTPATIENT)
Dept: FAMILY MEDICINE CLINIC | Facility: CLINIC | Age: 44
End: 2019-10-01
Payer: COMMERCIAL

## 2019-10-01 VITALS
RESPIRATION RATE: 15 BRPM | BODY MASS INDEX: 29.02 KG/M2 | DIASTOLIC BLOOD PRESSURE: 88 MMHG | TEMPERATURE: 99 F | SYSTOLIC BLOOD PRESSURE: 126 MMHG | WEIGHT: 174.2 LBS | HEIGHT: 65 IN | OXYGEN SATURATION: 99 % | HEART RATE: 72 BPM

## 2019-10-01 DIAGNOSIS — H10.33 ACUTE CONJUNCTIVITIS OF BOTH EYES, UNSPECIFIED ACUTE CONJUNCTIVITIS TYPE: ICD-10-CM

## 2019-10-01 DIAGNOSIS — I10 ESSENTIAL HYPERTENSION: ICD-10-CM

## 2019-10-01 DIAGNOSIS — Z12.39 BREAST CANCER SCREENING: Primary | ICD-10-CM

## 2019-10-01 PROCEDURE — 3079F DIAST BP 80-89 MM HG: CPT | Performed by: FAMILY MEDICINE

## 2019-10-01 PROCEDURE — 3074F SYST BP LT 130 MM HG: CPT | Performed by: FAMILY MEDICINE

## 2019-10-01 PROCEDURE — 99213 OFFICE O/P EST LOW 20 MIN: CPT | Performed by: FAMILY MEDICINE

## 2019-10-01 PROCEDURE — 3008F BODY MASS INDEX DOCD: CPT | Performed by: FAMILY MEDICINE

## 2019-10-01 RX ORDER — LOSARTAN POTASSIUM AND HYDROCHLOROTHIAZIDE 25; 100 MG/1; MG/1
TABLET ORAL
Qty: 90 TABLET | Refills: 1 | OUTPATIENT
Start: 2019-10-01

## 2019-10-01 RX ORDER — NEOMYCIN/POLYMYXIN B/HYDROCORT 3.5-10K-1
1 SUSPENSION, DROPS(FINAL DOSAGE FORM)(ML) OPHTHALMIC (EYE) 4 TIMES DAILY
Qty: 7.5 ML | Refills: 0 | Status: SHIPPED | OUTPATIENT
Start: 2019-10-01 | End: 2020-06-05

## 2019-10-01 RX ORDER — LOSARTAN POTASSIUM AND HYDROCHLOROTHIAZIDE 25; 100 MG/1; MG/1
1 TABLET ORAL DAILY
Qty: 90 TABLET | Refills: 1 | Status: SHIPPED | OUTPATIENT
Start: 2019-10-01 | End: 2019-10-14 | Stop reason: RX

## 2019-10-01 NOTE — PROGRESS NOTES
Chief Complaint   Patient presents with    Eye Drainage     started 2 days ago    Follow-up   Patient needs refills for blood pressure medication  Assessment/Plan:  No fingers for rubbing eyes, can spread this way  Diagnoses and all orders for this visit:    Breast cancer screening  -     Mammo screening bilateral w 3d & cad; Future    Essential hypertension  -     losartan-hydrochlorothiazide (HYZAAR) 100-25 MG per tablet; Take 1 tablet by mouth daily    Acute conjunctivitis of both eyes, unspecified acute conjunctivitis type  -     neomycin-polymyxin-hydrocortisone (CORTISPORIN) 0 35%-10,000 units/mL-1% ophthalmic suspension; Administer 1 drop to both eyes 4 (four) times a day for 3 days          Subjective:      Patient ID: Nishi Emerson is a 45 y o  female  Sent home from work for eye DC  Now both eyes being red  The following portions of the patient's history were reviewed and updated as appropriate: allergies, current medications, past family history, past medical history, past social history, past surgical history and problem list     Review of Systems   Constitutional: Negative  HENT: Negative  Eyes: Positive for discharge  Respiratory: Negative  Cardiovascular: Negative  Gastrointestinal: Negative  Genitourinary: Negative  Musculoskeletal: Negative  Skin: Negative  Neurological: Negative  Psychiatric/Behavioral: Negative            Objective:      /88 (BP Location: Left arm, Patient Position: Sitting, Cuff Size: Standard)   Pulse 72   Temp 99 °F (37 2 °C)   Resp 15   Ht 5' 5" (1 651 m)   Wt 79 kg (174 lb 3 2 oz)   SpO2 99%   BMI 28 99 kg/m²     PHQ-9 Depression Screening    PHQ-9:    Frequency of the following problems over the past two weeks:       Little interest or pleasure in doing things:  0 - not at all  Feeling down, depressed, or hopeless:  0 - not at all  PHQ-2 Score:  0          Physical Exam   Constitutional: She is oriented to person, place, and time  She appears well-developed and well-nourished  HENT:   Head: Normocephalic and atraumatic  Right Ear: External ear normal    Left Ear: External ear normal    Nose: Nose normal    Mouth/Throat: Oropharynx is clear and moist    Eyes: Pupils are equal, round, and reactive to light  EOM are normal    BL conjunctivitis  Neck: Normal range of motion  Neck supple  Cardiovascular: Normal rate, regular rhythm and normal heart sounds  Pulmonary/Chest: Effort normal and breath sounds normal    Neurological: She is alert and oriented to person, place, and time  She has normal reflexes  Skin: Skin is warm and dry  Psychiatric: She has a normal mood and affect   Her behavior is normal  Judgment and thought content normal

## 2019-10-03 ENCOUNTER — TELEPHONE (OUTPATIENT)
Dept: FAMILY MEDICINE CLINIC | Facility: CLINIC | Age: 44
End: 2019-10-03

## 2019-10-03 DIAGNOSIS — H10.33 ACUTE CONJUNCTIVITIS OF BOTH EYES, UNSPECIFIED ACUTE CONJUNCTIVITIS TYPE: Primary | ICD-10-CM

## 2019-10-03 DIAGNOSIS — I10 ESSENTIAL HYPERTENSION: ICD-10-CM

## 2019-10-03 RX ORDER — TOBRAMYCIN AND DEXAMETHASONE 3; 1 MG/ML; MG/ML
1 SUSPENSION/ DROPS OPHTHALMIC 4 TIMES DAILY
Qty: 10 ML | Refills: 0 | Status: SHIPPED | OUTPATIENT
Start: 2019-10-03 | End: 2020-06-05

## 2019-10-03 RX ORDER — LOSARTAN POTASSIUM 100 MG/1
100 TABLET ORAL DAILY
Qty: 90 TABLET | Refills: 1 | Status: SHIPPED | OUTPATIENT
Start: 2019-10-03 | End: 2019-12-20 | Stop reason: SDUPTHER

## 2019-10-03 NOTE — TELEPHONE ENCOUNTER
Patient called and said she received text from pharmacy about recall for Losartan, also, Neomycin eye drops are not available yet after patient was told they would be in stock after 1 day

## 2019-10-14 ENCOUNTER — TELEPHONE (OUTPATIENT)
Dept: FAMILY MEDICINE CLINIC | Facility: CLINIC | Age: 44
End: 2019-10-14

## 2019-10-14 DIAGNOSIS — I10 ESSENTIAL HYPERTENSION: ICD-10-CM

## 2019-10-14 DIAGNOSIS — I10 ESSENTIAL HYPERTENSION: Primary | ICD-10-CM

## 2019-10-14 RX ORDER — HYDROCHLOROTHIAZIDE 25 MG/1
25 TABLET ORAL DAILY
Qty: 30 TABLET | Refills: 5 | Status: SHIPPED | OUTPATIENT
Start: 2019-10-14 | End: 2019-12-20 | Stop reason: SDUPTHER

## 2019-10-14 NOTE — TELEPHONE ENCOUNTER
Was on Losartan-HCTZ 100-25 but now on Losartan 100 mg daily  BP at 152/82 at [facility] urgent care today  Went to Urgent care for symptoms of throat felt like closing, eye swelling, facial swelling and swollen toes and fingers seen at urgent care  Told has swelling due to not being on HCTZ anymore, instructed to call PCP to get water pill prescription

## 2019-10-14 NOTE — TELEPHONE ENCOUNTER
Called patient to notify her prescription for Hydrochlorothiazide 25 mg 1 po qd was sent to pharmacy  Instructed patient to continue Losartan 100 mg 1 po qd as well  Explained she use to take this medication together but since it was on backorder, patient will take each medication separately  She was also advised to call and schedule an appointment if her BP gets worst or does not get better or if throat still feels closed off because HCTZ does not usually effect the throat  She stated she understood all instructions and will call back if symptoms do not get better by the end of the week

## 2019-10-31 ENCOUNTER — TELEPHONE (OUTPATIENT)
Dept: FAMILY MEDICINE CLINIC | Facility: CLINIC | Age: 44
End: 2019-10-31

## 2019-10-31 NOTE — TELEPHONE ENCOUNTER
Patient states she has a headache that started Monday, developed in the evening  Pain is on right side of head near eye and temple, it comes and goes  She wakes up with the headache  Denies blurry vision or dizziness  Patient has tried taking Motrin and did not help

## 2019-10-31 NOTE — TELEPHONE ENCOUNTER
Per Dr Liliane Desai, patient should be taking 800-1000 mg of Ibuprofen, if that does not seem to help patient can go to an urgent care  Left a message for patient with instructions

## 2019-11-20 ENCOUNTER — APPOINTMENT (OUTPATIENT)
Dept: URGENT CARE | Age: 44
End: 2019-11-20

## 2019-11-20 ENCOUNTER — APPOINTMENT (OUTPATIENT)
Dept: LAB | Age: 44
End: 2019-11-20

## 2019-11-20 ENCOUNTER — TRANSCRIBE ORDERS (OUTPATIENT)
Dept: URGENT CARE | Age: 44
End: 2019-11-20

## 2019-11-20 DIAGNOSIS — Z13.9 SCREENING FOR UNSPECIFIED CONDITION: ICD-10-CM

## 2019-11-20 DIAGNOSIS — Z13.9 SCREENING FOR UNSPECIFIED CONDITION: Primary | ICD-10-CM

## 2019-11-20 LAB
CHOLEST SERPL-MCNC: 171 MG/DL (ref 50–200)
GLUCOSE P FAST SERPL-MCNC: 96 MG/DL (ref 65–99)
HDLC SERPL-MCNC: 57 MG/DL
LDLC SERPL CALC-MCNC: 97 MG/DL (ref 0–100)
NONHDLC SERPL-MCNC: 114 MG/DL
TRIGL SERPL-MCNC: 85 MG/DL

## 2019-11-20 PROCEDURE — 82947 ASSAY GLUCOSE BLOOD QUANT: CPT

## 2019-11-20 PROCEDURE — 36415 COLL VENOUS BLD VENIPUNCTURE: CPT

## 2019-11-20 PROCEDURE — 80061 LIPID PANEL: CPT

## 2019-11-20 PROCEDURE — 80323 ALKALOIDS NOS: CPT

## 2019-11-25 LAB
COTININE SERPL-MCNC: NORMAL NG/ML
NICOTINE SERPL-MCNC: NORMAL NG/ML

## 2019-12-12 ENCOUNTER — TELEPHONE (OUTPATIENT)
Dept: URGENT CARE | Facility: CLINIC | Age: 44
End: 2019-12-12

## 2019-12-12 NOTE — TELEPHONE ENCOUNTER
Pt called asking about the results from her biometric screening  Lab results discussed with pt  Lipid panel, fasting glucose, and nicotine screening were all WNL  Recommend patient continue to eat a healthy diet, engage in regular, moderate exercise, and discuss her lab results with her PCP  No further questions

## 2019-12-20 ENCOUNTER — OFFICE VISIT (OUTPATIENT)
Dept: FAMILY MEDICINE CLINIC | Facility: CLINIC | Age: 44
End: 2019-12-20
Payer: COMMERCIAL

## 2019-12-20 VITALS
HEIGHT: 65 IN | BODY MASS INDEX: 29.59 KG/M2 | OXYGEN SATURATION: 98 % | SYSTOLIC BLOOD PRESSURE: 148 MMHG | TEMPERATURE: 99 F | WEIGHT: 177.6 LBS | DIASTOLIC BLOOD PRESSURE: 90 MMHG | HEART RATE: 90 BPM | RESPIRATION RATE: 16 BRPM

## 2019-12-20 DIAGNOSIS — I10 ESSENTIAL HYPERTENSION: ICD-10-CM

## 2019-12-20 DIAGNOSIS — M99.02 SOMATIC DYSFUNCTION OF THORACIC REGION: ICD-10-CM

## 2019-12-20 DIAGNOSIS — M99.08 SEGMENTAL AND SOMATIC DYSFUNCTION OF RIB CAGE: ICD-10-CM

## 2019-12-20 DIAGNOSIS — S39.013A STRAIN OF MUSCLE, FASCIA AND TENDON OF PELVIS, INITIAL ENCOUNTER: ICD-10-CM

## 2019-12-20 DIAGNOSIS — S39.012A LUMBAR STRAIN, INITIAL ENCOUNTER: ICD-10-CM

## 2019-12-20 DIAGNOSIS — S39.012A STRAIN, SACRAL, INITIAL ENCOUNTER: ICD-10-CM

## 2019-12-20 DIAGNOSIS — M54.50 ACUTE LEFT-SIDED LOW BACK PAIN WITHOUT SCIATICA: Primary | ICD-10-CM

## 2019-12-20 DIAGNOSIS — M99.05 PELVIC SOMATIC DYSFUNCTION: ICD-10-CM

## 2019-12-20 DIAGNOSIS — S23.41XA RIB SPRAIN, INITIAL ENCOUNTER: ICD-10-CM

## 2019-12-20 DIAGNOSIS — M54.9 UPPER BACK PAIN ON RIGHT SIDE: ICD-10-CM

## 2019-12-20 DIAGNOSIS — M99.03 SEGMENTAL AND SOMATIC DYSFUNCTION OF LUMBAR REGION: ICD-10-CM

## 2019-12-20 DIAGNOSIS — S29.012A STRAIN OF MUSCLE AND TENDON OF BACK WALL OF THORAX, INITIAL ENCOUNTER: ICD-10-CM

## 2019-12-20 DIAGNOSIS — M99.04 SACRAL REGION SOMATIC DYSFUNCTION: ICD-10-CM

## 2019-12-20 PROCEDURE — 99214 OFFICE O/P EST MOD 30 MIN: CPT | Performed by: FAMILY MEDICINE

## 2019-12-20 PROCEDURE — 98927 OSTEOPATH MANJ 5-6 REGIONS: CPT | Performed by: FAMILY MEDICINE

## 2019-12-20 RX ORDER — LEVOFLOXACIN 500 MG/1
500 TABLET, FILM COATED ORAL DAILY
Refills: 0 | COMMUNITY
Start: 2019-12-11 | End: 2020-06-05

## 2019-12-20 RX ORDER — GABAPENTIN 300 MG/1
300 CAPSULE ORAL 3 TIMES DAILY
Refills: 0 | COMMUNITY
Start: 2019-12-11 | End: 2020-06-05

## 2019-12-20 RX ORDER — TRAMADOL HYDROCHLORIDE 50 MG/1
50 TABLET ORAL EVERY 6 HOURS PRN
Refills: 0 | COMMUNITY
Start: 2019-12-11 | End: 2020-06-05

## 2019-12-20 NOTE — PROGRESS NOTES
Chief Complaint   Patient presents with    Headache     feels sharp pain    Groin Pain     started about 3 weeks ago    Leg Pain       Assessment/Plan:  Caution with lifting  No problem-specific Assessment & Plan notes found for this encounter  Upper back pain on right side  Diagnoses and all orders for this visit:    Acute left-sided low back pain without sciatica    Essential hypertension  -     losartan (COZAAR) 100 MG tablet; Take 1 tablet (100 mg total) by mouth daily  -     hydrochlorothiazide (HYDRODIURIL) 25 mg tablet; Take 1 tablet (25 mg total) by mouth daily    Pelvic somatic dysfunction    Strain of muscle, fascia and tendon of pelvis, initial encounter    Sacral region somatic dysfunction    Strain, sacral, initial encounter    Lumbar strain, initial encounter    Segmental and somatic dysfunction of lumbar region    Rib sprain, initial encounter    Segmental and somatic dysfunction of rib cage    Somatic dysfunction of thoracic region    Strain of muscle and tendon of back wall of thorax, initial encounter    Other orders  -     levofloxacin (LEVAQUIN) 500 mg tablet; Take 500 mg by mouth daily  -     traMADol (ULTRAM) 50 mg tablet; Take 50 mg by mouth every 6 (six) hours as needed  -     gabapentin (NEURONTIN) 300 mg capsule; Take 300 mg by mouth 3 (three) times a day          Subjective:      Patient ID: Hernan Gong is a 45 y o  female  Here for several reasons  Occasional sharp pain right parietal area  Also Gets left groin discomfort and pulling down anterior thigh  Occasional LE lateral discomfort  Right CT junction discomfort  Denies trauma  Lifts patients  Concerned about blood clots  Also BP and refills  The following portions of the patient's history were reviewed and updated as appropriate: allergies, current medications, past medical history, past social history and problem list     Review of Systems   Constitutional: Negative  HENT: Negative      Eyes: Negative  Respiratory: Negative  Cardiovascular: Negative  Gastrointestinal: Negative  Genitourinary: Negative  Musculoskeletal: Positive for back pain, neck pain and neck stiffness  Skin: Negative  Neurological: Negative  Psychiatric/Behavioral: Negative  Objective:      /90 (BP Location: Left arm, Patient Position: Sitting, Cuff Size: Standard)   Pulse 90   Temp 99 °F (37 2 °C) (Oral)   Resp 16   Ht 5' 5" (1 651 m)   Wt 80 6 kg (177 lb 9 6 oz)   SpO2 98%   BMI 29 55 kg/m²       Current Outpatient Medications:     hydrochlorothiazide (HYDRODIURIL) 25 mg tablet, Take 1 tablet (25 mg total) by mouth daily, Disp: 30 tablet, Rfl: 5    losartan (COZAAR) 100 MG tablet, Take 1 tablet (100 mg total) by mouth daily, Disp: 90 tablet, Rfl: 1    aspirin 81 MG tablet, Take 81 mg by mouth, Disp: , Rfl:     gabapentin (NEURONTIN) 300 mg capsule, Take 300 mg by mouth 3 (three) times a day, Disp: , Rfl: 0    levofloxacin (LEVAQUIN) 500 mg tablet, Take 500 mg by mouth daily, Disp: , Rfl: 0    neomycin-polymyxin-hydrocortisone (CORTISPORIN) 0 35%-10,000 units/mL-1% ophthalmic suspension, Administer 1 drop to both eyes 4 (four) times a day for 3 days, Disp: 7 5 mL, Rfl: 0    norethindrone (MICRONOR) 0 35 MG tablet, Take 0 35 mg by mouth, Disp: , Rfl:     tobramycin-dexamethasone (TOBRADEX) ophthalmic suspension, Administer 1 drop to both eyes 4 (four) times a day for 3 days  (Patient not taking: Reported on 12/20/2019), Disp: 10 mL, Rfl: 0    traMADol (ULTRAM) 50 mg tablet, Take 50 mg by mouth every 6 (six) hours as needed, Disp: , Rfl: 0     Physical Exam   Constitutional: She is oriented to person, place, and time  She appears well-developed and well-nourished  HENT:   Head: Normocephalic and atraumatic     Right Ear: External ear normal    Left Ear: External ear normal    Nose: Nose normal    Mouth/Throat: Oropharynx is clear and moist    Eyes: Pupils are equal, round, and reactive to light  Conjunctivae and EOM are normal    Neck: Normal range of motion  Neck supple  Cardiovascular: Normal rate, regular rhythm and normal heart sounds  Pulmonary/Chest: Effort normal and breath sounds normal    Musculoskeletal:   Stand: Left posterior innominate  Sacrum: R on R torsion  Mid lumbar rotated right  Sit: Rib #1 right up and anterior  T1 rotated left  Neurological: She is alert and oriented to person, place, and time  She has normal reflexes  Skin: Skin is warm and dry  Psychiatric: She has a normal mood and affect   Her behavior is normal  Judgment and thought content normal

## 2019-12-23 RX ORDER — HYDROCHLOROTHIAZIDE 25 MG/1
25 TABLET ORAL DAILY
Qty: 90 TABLET | Refills: 1 | Status: SHIPPED | OUTPATIENT
Start: 2019-12-23 | End: 2020-06-05 | Stop reason: SDUPTHER

## 2019-12-23 RX ORDER — LOSARTAN POTASSIUM 100 MG/1
100 TABLET ORAL DAILY
Qty: 90 TABLET | Refills: 1 | Status: SHIPPED | OUTPATIENT
Start: 2019-12-23 | End: 2020-06-05 | Stop reason: SDUPTHER

## 2019-12-23 NOTE — PROGRESS NOTES
OMT  Performed by: Rochelle Fall DO  Authorized by: Rochelle Fall DO     Verbal consent obtained?: Yes    Risks and benefits: Risks, benefits and alternatives were discussed    Consent given by:  Patient  Procedure Details:     Region evaluated and treated:  Thoracic T1 - T4, Ribs, Lumbar, Sacrum/Pelvis and Pelvis Innominate    Thoracic T1 - T4 details:     Examination Method:  Asymmetry, Misalignment, Crepitation, Defects, Masses and Tenderness, Pain    Severity:  Mild    Treatment Method:  High Velocity, Low Amplitude Treatment and Soft Tissue Treatment    Response:  Resolved    Lumbar details:     Examination Method:  Asymmetry, Misalignment, Crepitation, Defects, Masses and Tenderness, Pain    Severity:  Mild    Treatment Method:  High Velocity, Low Amplitude Treatment and Soft Tissue Treatment    Response:  Resolved - The somatic dysfunction is completely resolved without evidence of it ever having been present  Sacrum/Pelvis details:     Examination Method:  Asymmetry, Misalignment, Crepitation, Defects, Masses and Tenderness, Pain    Severity:  Mild    Treatment Method:  High Velocity, Low Amplitude Treatment and Soft Tissue Treatment    Response:  Resolved - The somatic dysfunction is completely resolved without evidence of it ever having been present  Pelvis Innominate details:     Examination Method:  Asymmetry, Misalignment, Crepitation, Defects, Masses and Tenderness, Pain    Severity:  Mild    Treatment Method:  High Velocity, Low Amplitude Treatment and Soft Tissue Treatment    Response:  Resolved - The somatic dysfunction is completely resolved without evidence of it ever having been present      Ribs details:     Examination Method:  Asymmetry, Misalignment, Crepitation, Defects, Masses and Tenderness, Pain    Severity:  Mild    Treatment Method:  High Velocity, Low Amplitude Treatment, Muscle Energy Treatment and Soft Tissue Treatment    Response:  Resolved - The somatic dysfunction is completely resolved without evidence of it ever having been present      Total Regions Treated:  5

## 2020-02-04 ENCOUNTER — TELEPHONE (OUTPATIENT)
Dept: FAMILY MEDICINE CLINIC | Facility: CLINIC | Age: 45
End: 2020-02-04

## 2020-03-10 ENCOUNTER — OFFICE VISIT (OUTPATIENT)
Dept: FAMILY MEDICINE CLINIC | Facility: CLINIC | Age: 45
End: 2020-03-10
Payer: COMMERCIAL

## 2020-03-10 VITALS
SYSTOLIC BLOOD PRESSURE: 138 MMHG | OXYGEN SATURATION: 96 % | HEART RATE: 73 BPM | BODY MASS INDEX: 28.42 KG/M2 | DIASTOLIC BLOOD PRESSURE: 88 MMHG | TEMPERATURE: 99 F | WEIGHT: 170.6 LBS | RESPIRATION RATE: 16 BRPM | HEIGHT: 65 IN

## 2020-03-10 DIAGNOSIS — Z00.00 HEALTH CARE MAINTENANCE: Primary | ICD-10-CM

## 2020-03-10 PROCEDURE — 99396 PREV VISIT EST AGE 40-64: CPT | Performed by: FAMILY MEDICINE

## 2020-03-10 NOTE — PROGRESS NOTES
Chief Complaint   Patient presents with    Physical Exam     yearly pe, needs quantiferon tb blood work       Assessment/Plan:  TB Gold for work  Diagnoses and all orders for this visit:    Health care maintenance  -     Quantiferon TB Gold Plus; Future          Subjective:      Patient ID: Linda Mccarthy is a 45 y o  female  Health main  The following portions of the patient's history were reviewed and updated as appropriate: allergies, current medications, past family history, past medical history, past social history, past surgical history and problem list     Review of Systems   Constitutional: Negative  HENT: Negative  Eyes: Negative  Respiratory: Negative  Cardiovascular: Negative  Gastrointestinal: Negative  Genitourinary: Negative  Musculoskeletal: Negative  Skin: Negative  Neurological: Negative  Psychiatric/Behavioral: Negative            Objective:      /88 (BP Location: Left arm, Patient Position: Sitting, Cuff Size: Standard)   Pulse 73   Temp 99 °F (37 2 °C) (Oral)   Resp 16   Ht 5' 5" (1 651 m)   Wt 77 4 kg (170 lb 9 6 oz)   SpO2 96%   BMI 28 39 kg/m²       Current Outpatient Medications:     aspirin 81 MG tablet, Take 81 mg by mouth, Disp: , Rfl:     hydrochlorothiazide (HYDRODIURIL) 25 mg tablet, Take 1 tablet (25 mg total) by mouth daily, Disp: 90 tablet, Rfl: 1    losartan (COZAAR) 100 MG tablet, Take 1 tablet (100 mg total) by mouth daily, Disp: 90 tablet, Rfl: 1    gabapentin (NEURONTIN) 300 mg capsule, Take 300 mg by mouth 3 (three) times a day, Disp: , Rfl: 0    levofloxacin (LEVAQUIN) 500 mg tablet, Take 500 mg by mouth daily, Disp: , Rfl: 0    neomycin-polymyxin-hydrocortisone (CORTISPORIN) 0 35%-10,000 units/mL-1% ophthalmic suspension, Administer 1 drop to both eyes 4 (four) times a day for 3 days, Disp: 7 5 mL, Rfl: 0    norethindrone (MICRONOR) 0 35 MG tablet, Take 0 35 mg by mouth, Disp: , Rfl:    tobramycin-dexamethasone (TOBRADEX) ophthalmic suspension, Administer 1 drop to both eyes 4 (four) times a day for 3 days  (Patient not taking: Reported on 12/20/2019), Disp: 10 mL, Rfl: 0    traMADol (ULTRAM) 50 mg tablet, Take 50 mg by mouth every 6 (six) hours as needed, Disp: , Rfl: 0     Physical Exam   Constitutional: She is oriented to person, place, and time  She appears well-developed and well-nourished  HENT:   Head: Normocephalic and atraumatic  Right Ear: External ear normal    Left Ear: External ear normal    Nose: Nose normal    Mouth/Throat: Oropharynx is clear and moist    Eyes: Pupils are equal, round, and reactive to light  Conjunctivae and EOM are normal    Neck: Normal range of motion  Neck supple  Cardiovascular: Normal rate, regular rhythm, normal heart sounds and intact distal pulses  Pulmonary/Chest: Effort normal and breath sounds normal    Abdominal: Soft  Bowel sounds are normal    Neurological: She is alert and oriented to person, place, and time  She has normal reflexes  Skin: Skin is warm and dry  Psychiatric: She has a normal mood and affect   Her behavior is normal  Judgment and thought content normal

## 2020-03-26 LAB
GAMMA INTERFERON BACKGROUND BLD IA-ACNC: 0.05 IU/ML
M TB IFN-G CD4+ T-CELLS BLD-ACNC: 0.06 IU/ML
M TB IFN-G CD4+ T-CELLS BLD-ACNC: 0.06 IU/ML
MITOGEN IGNF BLD-ACNC: >10 IU/ML
QUANTIFERON INCUBATION COMMENT: NORMAL
QUANTIFERON-TB GOLD PLUS: NEGATIVE
SERVICE CMNT-IMP: NORMAL

## 2020-03-27 ENCOUNTER — TELEPHONE (OUTPATIENT)
Dept: FAMILY MEDICINE CLINIC | Facility: CLINIC | Age: 45
End: 2020-03-27

## 2020-03-27 NOTE — TELEPHONE ENCOUNTER
Emailed patient PE form and Quantiferon lab result along with message to forward immunizations/titers in our office for our records since she states she does have them

## 2020-03-27 NOTE — TELEPHONE ENCOUNTER
Patient called to request quantiferon TB Gold plus lab result and completed PE form to be emailed to her so she can forward to her employer

## 2020-04-10 ENCOUNTER — TELEPHONE (OUTPATIENT)
Dept: FAMILY MEDICINE CLINIC | Facility: CLINIC | Age: 45
End: 2020-04-10

## 2020-05-24 DIAGNOSIS — I10 ESSENTIAL HYPERTENSION: ICD-10-CM

## 2020-05-26 RX ORDER — HYDROCHLOROTHIAZIDE 25 MG/1
TABLET ORAL
Qty: 90 TABLET | Refills: 1 | OUTPATIENT
Start: 2020-05-26

## 2020-06-03 ENCOUNTER — TELEPHONE (OUTPATIENT)
Dept: FAMILY MEDICINE CLINIC | Facility: CLINIC | Age: 45
End: 2020-06-03

## 2020-06-05 ENCOUNTER — OFFICE VISIT (OUTPATIENT)
Dept: FAMILY MEDICINE CLINIC | Facility: CLINIC | Age: 45
End: 2020-06-05

## 2020-06-05 VITALS
BODY MASS INDEX: 28.72 KG/M2 | SYSTOLIC BLOOD PRESSURE: 156 MMHG | HEIGHT: 65 IN | HEART RATE: 97 BPM | TEMPERATURE: 98.8 F | WEIGHT: 172.4 LBS | DIASTOLIC BLOOD PRESSURE: 82 MMHG | OXYGEN SATURATION: 98 %

## 2020-06-05 DIAGNOSIS — J39.2 THROAT IRRITATION: Primary | ICD-10-CM

## 2020-06-05 DIAGNOSIS — I10 ESSENTIAL HYPERTENSION: ICD-10-CM

## 2020-06-05 PROCEDURE — 3077F SYST BP >= 140 MM HG: CPT | Performed by: FAMILY MEDICINE

## 2020-06-05 PROCEDURE — 1036F TOBACCO NON-USER: CPT | Performed by: FAMILY MEDICINE

## 2020-06-05 PROCEDURE — 3079F DIAST BP 80-89 MM HG: CPT | Performed by: FAMILY MEDICINE

## 2020-06-05 PROCEDURE — 99213 OFFICE O/P EST LOW 20 MIN: CPT | Performed by: FAMILY MEDICINE

## 2020-06-05 RX ORDER — LOSARTAN POTASSIUM 100 MG/1
100 TABLET ORAL DAILY
Qty: 90 TABLET | Refills: 1 | Status: SHIPPED | OUTPATIENT
Start: 2020-06-05 | End: 2020-10-23 | Stop reason: SDUPTHER

## 2020-06-05 RX ORDER — HYDROCHLOROTHIAZIDE 25 MG/1
25 TABLET ORAL DAILY
Qty: 90 TABLET | Refills: 1 | Status: SHIPPED | OUTPATIENT
Start: 2020-06-05 | End: 2020-12-09 | Stop reason: SDUPTHER

## 2020-09-28 ENCOUNTER — LAB REQUISITION (OUTPATIENT)
Dept: LAB | Facility: HOSPITAL | Age: 45
End: 2020-09-28
Payer: COMMERCIAL

## 2020-09-28 DIAGNOSIS — R50.9 FEVER, UNSPECIFIED: ICD-10-CM

## 2020-09-28 DIAGNOSIS — R05.9 COUGH: ICD-10-CM

## 2020-09-28 DIAGNOSIS — R06.02 SHORTNESS OF BREATH: ICD-10-CM

## 2020-09-28 PROCEDURE — 87635 SARS-COV-2 COVID-19 AMP PRB: CPT | Performed by: FAMILY MEDICINE

## 2020-09-29 LAB — SARS-COV-2 RNA RESP QL NAA+PROBE: NEGATIVE

## 2020-10-13 ENCOUNTER — TELEPHONE (OUTPATIENT)
Dept: FAMILY MEDICINE CLINIC | Facility: CLINIC | Age: 45
End: 2020-10-13

## 2020-10-14 DIAGNOSIS — I10 ESSENTIAL HYPERTENSION: ICD-10-CM

## 2020-10-14 RX ORDER — LOSARTAN POTASSIUM 100 MG/1
TABLET ORAL
Qty: 90 TABLET | Refills: 1 | OUTPATIENT
Start: 2020-10-14

## 2020-10-23 DIAGNOSIS — I10 ESSENTIAL HYPERTENSION: ICD-10-CM

## 2020-10-23 RX ORDER — LOSARTAN POTASSIUM 100 MG/1
TABLET ORAL
Qty: 90 TABLET | Refills: 1 | OUTPATIENT
Start: 2020-10-23

## 2020-10-23 RX ORDER — LOSARTAN POTASSIUM 100 MG/1
100 TABLET ORAL DAILY
Qty: 90 TABLET | Refills: 0 | Status: SHIPPED | OUTPATIENT
Start: 2020-10-23 | End: 2020-12-23 | Stop reason: SDUPTHER

## 2020-11-27 ENCOUNTER — TELEPHONE (OUTPATIENT)
Dept: FAMILY MEDICINE CLINIC | Facility: CLINIC | Age: 45
End: 2020-11-27

## 2020-11-27 DIAGNOSIS — Z03.818 ENCOUNTER FOR OBSERVATION FOR SUSPECTED EXPOSURE TO OTHER BIOLOGICAL AGENTS RULED OUT: ICD-10-CM

## 2020-11-27 DIAGNOSIS — Z03.818 ENCOUNTER FOR OBSERVATION FOR SUSPECTED EXPOSURE TO OTHER BIOLOGICAL AGENTS RULED OUT: Primary | ICD-10-CM

## 2020-11-27 PROCEDURE — U0003 INFECTIOUS AGENT DETECTION BY NUCLEIC ACID (DNA OR RNA); SEVERE ACUTE RESPIRATORY SYNDROME CORONAVIRUS 2 (SARS-COV-2) (CORONAVIRUS DISEASE [COVID-19]), AMPLIFIED PROBE TECHNIQUE, MAKING USE OF HIGH THROUGHPUT TECHNOLOGIES AS DESCRIBED BY CMS-2020-01-R: HCPCS | Performed by: FAMILY MEDICINE

## 2020-11-28 ENCOUNTER — TELEPHONE (OUTPATIENT)
Dept: OTHER | Facility: OTHER | Age: 45
End: 2020-11-28

## 2020-11-28 LAB — SARS-COV-2 RNA SPEC QL NAA+PROBE: DETECTED

## 2020-12-09 DIAGNOSIS — I10 ESSENTIAL HYPERTENSION: ICD-10-CM

## 2020-12-09 RX ORDER — HYDROCHLOROTHIAZIDE 25 MG/1
25 TABLET ORAL DAILY
Qty: 30 TABLET | Refills: 0 | Status: SHIPPED | OUTPATIENT
Start: 2020-12-09 | End: 2020-12-23 | Stop reason: SDUPTHER

## 2020-12-09 RX ORDER — HYDROCHLOROTHIAZIDE 25 MG/1
25 TABLET ORAL DAILY
Qty: 90 TABLET | Refills: 1 | OUTPATIENT
Start: 2020-12-09

## 2020-12-10 RX ORDER — HYDROCHLOROTHIAZIDE 25 MG/1
TABLET ORAL
Qty: 90 TABLET | Refills: 1 | OUTPATIENT
Start: 2020-12-10

## 2020-12-11 DIAGNOSIS — I10 ESSENTIAL HYPERTENSION: ICD-10-CM

## 2020-12-14 RX ORDER — LOSARTAN POTASSIUM 100 MG/1
TABLET ORAL
Qty: 90 TABLET | Refills: 0 | OUTPATIENT
Start: 2020-12-14

## 2020-12-22 ENCOUNTER — LAB (OUTPATIENT)
Dept: LAB | Facility: CLINIC | Age: 45
End: 2020-12-22
Payer: COMMERCIAL

## 2020-12-22 DIAGNOSIS — I10 ESSENTIAL HYPERTENSION: ICD-10-CM

## 2020-12-22 LAB
ALBUMIN SERPL BCP-MCNC: 3.9 G/DL (ref 3.5–5)
ALP SERPL-CCNC: 55 U/L (ref 46–116)
ALT SERPL W P-5'-P-CCNC: 22 U/L (ref 12–78)
ANION GAP SERPL CALCULATED.3IONS-SCNC: 6 MMOL/L (ref 4–13)
AST SERPL W P-5'-P-CCNC: 13 U/L (ref 5–45)
BILIRUB DIRECT SERPL-MCNC: 0.14 MG/DL (ref 0–0.2)
BILIRUB SERPL-MCNC: 0.59 MG/DL (ref 0.2–1)
BUN SERPL-MCNC: 14 MG/DL (ref 5–25)
CALCIUM SERPL-MCNC: 9.1 MG/DL (ref 8.3–10.1)
CHLORIDE SERPL-SCNC: 103 MMOL/L (ref 100–108)
CHOLEST SERPL-MCNC: 169 MG/DL (ref 50–200)
CO2 SERPL-SCNC: 31 MMOL/L (ref 21–32)
CREAT SERPL-MCNC: 0.88 MG/DL (ref 0.6–1.3)
GFR SERPL CREATININE-BSD FRML MDRD: 93 ML/MIN/1.73SQ M
GLUCOSE P FAST SERPL-MCNC: 83 MG/DL (ref 65–99)
HDLC SERPL-MCNC: 74 MG/DL
LDLC SERPL CALC-MCNC: 85 MG/DL (ref 0–100)
NONHDLC SERPL-MCNC: 95 MG/DL
POTASSIUM SERPL-SCNC: 3.5 MMOL/L (ref 3.5–5.3)
PROT SERPL-MCNC: 8 G/DL (ref 6.4–8.2)
SODIUM SERPL-SCNC: 140 MMOL/L (ref 136–145)
TRIGL SERPL-MCNC: 49 MG/DL

## 2020-12-22 PROCEDURE — 80053 COMPREHEN METABOLIC PANEL: CPT

## 2020-12-22 PROCEDURE — 36415 COLL VENOUS BLD VENIPUNCTURE: CPT

## 2020-12-22 PROCEDURE — 80061 LIPID PANEL: CPT

## 2020-12-22 PROCEDURE — 82248 BILIRUBIN DIRECT: CPT

## 2020-12-23 ENCOUNTER — TELEPHONE (OUTPATIENT)
Dept: FAMILY MEDICINE CLINIC | Facility: CLINIC | Age: 45
End: 2020-12-23

## 2020-12-23 DIAGNOSIS — I10 ESSENTIAL HYPERTENSION: ICD-10-CM

## 2020-12-23 RX ORDER — LOSARTAN POTASSIUM 100 MG/1
100 TABLET ORAL DAILY
Qty: 90 TABLET | Refills: 0 | Status: SHIPPED | OUTPATIENT
Start: 2020-12-23 | End: 2022-06-28 | Stop reason: SDUPTHER

## 2020-12-23 RX ORDER — HYDROCHLOROTHIAZIDE 25 MG/1
25 TABLET ORAL DAILY
Qty: 90 TABLET | Refills: 0 | Status: SHIPPED | OUTPATIENT
Start: 2020-12-23 | End: 2022-06-28 | Stop reason: SDUPTHER

## 2022-06-28 DIAGNOSIS — I10 ESSENTIAL HYPERTENSION: ICD-10-CM

## 2022-06-28 RX ORDER — LOSARTAN POTASSIUM 100 MG/1
100 TABLET ORAL DAILY
Qty: 30 TABLET | Refills: 0 | Status: SHIPPED | OUTPATIENT
Start: 2022-06-28 | End: 2022-07-13 | Stop reason: SDUPTHER

## 2022-06-28 RX ORDER — AMLODIPINE BESYLATE 5 MG/1
5 TABLET ORAL DAILY
Qty: 30 TABLET | Refills: 0 | Status: SHIPPED | OUTPATIENT
Start: 2022-06-28 | End: 2022-07-13 | Stop reason: SDUPTHER

## 2022-06-28 RX ORDER — HYDROCHLOROTHIAZIDE 25 MG/1
25 TABLET ORAL DAILY
Qty: 30 TABLET | Refills: 0 | Status: SHIPPED | OUTPATIENT
Start: 2022-06-28 | End: 2022-07-13 | Stop reason: SDUPTHER

## 2022-07-13 DIAGNOSIS — I10 ESSENTIAL HYPERTENSION: ICD-10-CM

## 2022-07-13 RX ORDER — LOSARTAN POTASSIUM 100 MG/1
100 TABLET ORAL DAILY
Qty: 30 TABLET | Refills: 0 | Status: SHIPPED | OUTPATIENT
Start: 2022-07-13 | End: 2022-08-09 | Stop reason: SDUPTHER

## 2022-07-13 RX ORDER — AMLODIPINE BESYLATE 5 MG/1
5 TABLET ORAL DAILY
Qty: 30 TABLET | Refills: 0 | Status: SHIPPED | OUTPATIENT
Start: 2022-07-13 | End: 2022-08-09 | Stop reason: SDUPTHER

## 2022-07-13 RX ORDER — HYDROCHLOROTHIAZIDE 25 MG/1
25 TABLET ORAL DAILY
Qty: 30 TABLET | Refills: 0 | Status: SHIPPED | OUTPATIENT
Start: 2022-07-13 | End: 2022-08-09 | Stop reason: SDUPTHER

## 2022-08-09 ENCOUNTER — OFFICE VISIT (OUTPATIENT)
Dept: FAMILY MEDICINE CLINIC | Facility: CLINIC | Age: 47
End: 2022-08-09
Payer: MEDICARE

## 2022-08-09 VITALS
HEART RATE: 79 BPM | OXYGEN SATURATION: 100 % | BODY MASS INDEX: 30.92 KG/M2 | TEMPERATURE: 98.8 F | HEIGHT: 65 IN | WEIGHT: 185.6 LBS | DIASTOLIC BLOOD PRESSURE: 84 MMHG | SYSTOLIC BLOOD PRESSURE: 146 MMHG

## 2022-08-09 DIAGNOSIS — R51.9 NONINTRACTABLE EPISODIC HEADACHE, UNSPECIFIED HEADACHE TYPE: ICD-10-CM

## 2022-08-09 DIAGNOSIS — E55.9 VITAMIN D DEFICIENCY: Primary | ICD-10-CM

## 2022-08-09 DIAGNOSIS — G44.309 HEADACHES DUE TO OLD HEAD INJURY: ICD-10-CM

## 2022-08-09 DIAGNOSIS — I10 ESSENTIAL HYPERTENSION: ICD-10-CM

## 2022-08-09 DIAGNOSIS — S09.90XS HEADACHES DUE TO OLD HEAD INJURY: ICD-10-CM

## 2022-08-09 PROCEDURE — 99213 OFFICE O/P EST LOW 20 MIN: CPT | Performed by: FAMILY MEDICINE

## 2022-08-09 RX ORDER — LOSARTAN POTASSIUM 100 MG/1
100 TABLET ORAL DAILY
Qty: 90 TABLET | Refills: 1 | Status: SHIPPED | OUTPATIENT
Start: 2022-08-09 | End: 2022-09-08

## 2022-08-09 RX ORDER — AMLODIPINE BESYLATE 5 MG/1
5 TABLET ORAL DAILY
Qty: 90 TABLET | Refills: 1 | Status: SHIPPED | OUTPATIENT
Start: 2022-08-09 | End: 2022-09-08

## 2022-08-09 RX ORDER — HYDROCHLOROTHIAZIDE 25 MG/1
25 TABLET ORAL DAILY
Qty: 90 TABLET | Refills: 1 | Status: SHIPPED | OUTPATIENT
Start: 2022-08-09 | End: 2022-09-08

## 2022-08-09 NOTE — PROGRESS NOTES
Chief Complaint   Patient presents with    Follow-up    Hypertension     Assessment/Plan:  Labs and neuro evaluation  BMI Counseling: Body mass index is 30 89 kg/m²  The BMI is above normal  Nutrition recommendations include reducing portion sizes, consuming healthier snacks, moderation in carbohydrate intake and increasing intake of lean protein  PHQ-2/9 Depression Screening    Little interest or pleasure in doing things: 0 - not at all  Feeling down, depressed, or hopeless: 0 - not at all  PHQ-2 Score: 0  PHQ-2 Interpretation: Negative depression screen            Diagnoses and all orders for this visit:    Vitamin D deficiency  -     Vitamin D 25 hydroxy; Future    Essential hypertension  -     amLODIPine (NORVASC) 5 mg tablet; Take 1 tablet (5 mg total) by mouth daily  -     hydrochlorothiazide (HYDRODIURIL) 25 mg tablet; Take 1 tablet (25 mg total) by mouth daily  -     losartan (COZAAR) 100 MG tablet; Take 1 tablet (100 mg total) by mouth daily  -     Basic metabolic panel; Future  -     CBC and Platelet; Future  -     Hepatic function panel; Future  -     Lipid panel; Future    Headaches due to old head injury    Nonintractable episodic headache, unspecified headache type  -     Ambulatory Referral to Neurology; Future          Subjective:      Patient ID: Mohsen Turner is a 41 y o  female  Refill med's  Need labs  Had some labs in [place]  Presently out of work - rotator cuff partial tear  The following portions of the patient's history were reviewed and updated as appropriate: allergies, current medications, past family history, past medical history, past social history, past surgical history and problem list     Review of Systems   Constitutional: Negative  HENT: Negative  Eyes: Negative  Respiratory: Negative  Cardiovascular: Negative  Gastrointestinal: Negative  Genitourinary: Negative  Musculoskeletal: Negative  Skin: Negative      Neurological: Positive for headaches  HA's on and off  Psychiatric/Behavioral: Negative  Objective:      /84 (BP Location: Left arm, Patient Position: Sitting, Cuff Size: Large)   Pulse 79   Temp 98 8 °F (37 1 °C) (Tympanic)   Ht 5' 5" (1 651 m)   Wt 84 2 kg (185 lb 9 6 oz)   LMP  (LMP Unknown)   SpO2 100%   BMI 30 89 kg/m²       Current Outpatient Medications:     amLODIPine (NORVASC) 5 mg tablet, Take 1 tablet (5 mg total) by mouth daily, Disp: 30 tablet, Rfl: 0    Ascorbic Acid (VITAMIN C PO), Take by mouth, Disp: , Rfl:     aspirin 81 MG tablet, Take 81 mg by mouth, Disp: , Rfl:     hydrochlorothiazide (HYDRODIURIL) 25 mg tablet, Take 1 tablet (25 mg total) by mouth daily, Disp: 30 tablet, Rfl: 0    losartan (COZAAR) 100 MG tablet, Take 1 tablet (100 mg total) by mouth daily, Disp: 30 tablet, Rfl: 0    Multiple Vitamins-Minerals (AIRBORNE PO), Take by mouth (Patient not taking: Reported on 8/9/2022), Disp: , Rfl:     Omega-3 Fatty Acids (FISH OIL PO), Take by mouth (Patient not taking: Reported on 8/9/2022), Disp: , Rfl:     No Known Allergies    No past surgical history on file  Physical Exam  Constitutional:       Appearance: She is well-developed  HENT:      Head: Normocephalic and atraumatic  Right Ear: External ear normal       Left Ear: External ear normal       Nose: Nose normal    Eyes:      Conjunctiva/sclera: Conjunctivae normal       Pupils: Pupils are equal, round, and reactive to light  Cardiovascular:      Rate and Rhythm: Normal rate and regular rhythm  Heart sounds: Normal heart sounds  Pulmonary:      Effort: Pulmonary effort is normal       Breath sounds: Normal breath sounds  Abdominal:      General: Bowel sounds are normal       Palpations: Abdomen is soft  Musculoskeletal:      Cervical back: Normal range of motion and neck supple  Skin:     General: Skin is warm and dry     Neurological:      Mental Status: She is alert and oriented to person, place, and time  Deep Tendon Reflexes: Reflexes are normal and symmetric  Psychiatric:         Behavior: Behavior normal          Thought Content:  Thought content normal          Judgment: Judgment normal

## 2022-08-10 ENCOUNTER — TELEPHONE (OUTPATIENT)
Dept: NEUROLOGY | Facility: CLINIC | Age: 47
End: 2022-08-10

## 2022-08-10 NOTE — TELEPHONE ENCOUNTER
Patient called to schedule new patient appointment for headaches  No testing done  Triage intake sent

## 2022-08-12 ENCOUNTER — TELEPHONE (OUTPATIENT)
Dept: NEUROLOGY | Facility: CLINIC | Age: 47
End: 2022-08-12

## 2023-01-16 ENCOUNTER — TELEPHONE (OUTPATIENT)
Dept: NEUROLOGY | Facility: CLINIC | Age: 48
End: 2023-01-16

## 2023-01-16 NOTE — TELEPHONE ENCOUNTER
Hi, my name is Paulina Seymour,  i'm calling to confirm that I have an appointment for January 19th  My number is [phone]  Thank you      Called pt and confirmed appt for 1/19 at 2pm in [facility]

## 2023-01-17 ENCOUNTER — TELEPHONE (OUTPATIENT)
Dept: NEUROLOGY | Facility: CLINIC | Age: 48
End: 2023-01-17

## 2023-01-17 NOTE — TELEPHONE ENCOUNTER
THE [facility] to confirm patient's 1/19/2023 @ 2 pm appointment with Dr Rachana Ghotra at the [facility]  Call back number given [phone]

## 2023-01-19 ENCOUNTER — CONSULT (OUTPATIENT)
Dept: NEUROLOGY | Facility: CLINIC | Age: 48
End: 2023-01-19

## 2023-01-19 VITALS
HEIGHT: 65 IN | DIASTOLIC BLOOD PRESSURE: 70 MMHG | BODY MASS INDEX: 31.49 KG/M2 | SYSTOLIC BLOOD PRESSURE: 130 MMHG | HEART RATE: 74 BPM | WEIGHT: 189 LBS

## 2023-01-19 DIAGNOSIS — R51.9 NONINTRACTABLE EPISODIC HEADACHE, UNSPECIFIED HEADACHE TYPE: ICD-10-CM

## 2023-01-19 DIAGNOSIS — G44.009 CLUSTER HEADACHE: Primary | ICD-10-CM

## 2023-01-19 RX ORDER — VERAPAMIL HYDROCHLORIDE 40 MG/1
40 TABLET ORAL 3 TIMES DAILY
Qty: 90 TABLET | Refills: 3 | Status: CANCELLED | OUTPATIENT
Start: 2023-01-19 | End: 2023-02-18

## 2023-01-19 RX ORDER — SUMATRIPTAN 100 MG/1
TABLET, FILM COATED ORAL
Qty: 9 TABLET | Refills: 3 | Status: SHIPPED | OUTPATIENT
Start: 2023-01-19

## 2023-01-19 NOTE — ASSESSMENT & PLAN NOTE
Assessment:  Mrs Fuad Wheatley is a [id number] that presented to the neurology residency clinic for evaluation of headaches  During evaluation a picture of cluster headaches versus hemicrania continua became apparent  Further evaluation and response to medications will need to be completed to establish a more likely scenario  At this time we are suspecting cluster headaches  Further workup will entail an MRI Brain wwo for evaluation of any structural and/ inflammatory causes  An MRA Angiogram will help with vascular causes of headache  With the headaches occurring 1 to 2 times per month a preventive agent will be of less utility  An abortive agent will be trailed      Plan:  - MRI Brain wwo  - MRA Angiogram head wo  - BMP  - Start Sumatriptan 50mg initially, if needed 100mg as needed for headache as abortive medication  - Follow up in 3 months  - Please call neurology to follow up in regards to sumatriptan efficacy

## 2023-01-19 NOTE — PROGRESS NOTES
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 for evaluation of headaches  During evaluation a picture of cluster headaches versus hemicrania continua became apparent  Further evaluation and response to medications will need to be completed to establish a more likely scenario  At this time we are suspecting cluster headaches  Further workup will entail an MRI Brain wwo for evaluation of any structural and/ inflammatory causes  An MRA Angiogram will help with vascular causes of headache  With the headaches occurring 1 to 2 times per month a preventive agent will be of less utility  An abortive agent will be trailed  Plan:  - MRI Brain wwo  - MRA Angiogram head wo  - BMP  - Start Sumatriptan 50mg initially, if needed 100mg as needed for headache as abortive medication  - Follow up in 3 months  - Please call neurology to follow up in regards to sumatriptan efficacy     Diagnoses and all orders for this visit:    Cluster headache  -     SUMAtriptan (Imitrex) 100 mg tablet; Take 0 5 tablets as needed for headache  Can repeat after 2 hours if headache does not resolve, please do not take more than 2 doses in 24 hours  -     MRI angiogram head without contrast; Future  -     MRI brain w wo contrast; Future  -     Basic metabolic panel; Future    Nonintractable episodic headache, unspecified headache type  -     Ambulatory Referral to Neurology         Subjective:  She complains of a headache  She does not have a headache at this time      Description of Headaches:  Location of pain: left-sided unilateral, parietal, frontal  Radiation of pain?:none  Character of pain:sharp, shooting and stabbing  Severity of pain:10  Accompanying symptoms: vertigo  Prodromal sx?: nasal congestion  Rapidity of onset: sudden  Typical duration of individual headache: 2minutes  Frequency of headaches: 1-2 per month  Are you ever headache free? yes  Are most headaches similar in presentation? yes  Exacerbating factors:Nothing  Typical precipitants: None    Temporal Pattern of Headaches:  Started having HA's: 4 years ago  Worst time of day: None  Awaken from sleep?: yes  Seasonal pattern?: no  'Clustering' of HA's over time? yes - Patient would clusters of 2-3 headaches  Overall pattern since problem began: rapidly worsening  Degree of Functional Impairment: intermittent    Current Use of Meds to Treat HA:  Abortive meds? acetaminophen  Daily use? no  Preventive meds? None  Non-Medical/Alternative treatments for HA: no    Additional Relevant History:  History of head/neck trauma? no  History of head/neck surgery? no  Family h/o headache problems?no  Family history of aneurysms? no  Use of meds that might worsen HA's: None  Exposure to carbon monoxide? no  Substance use: alcohol: Socially, illicit drugs: None, tobacco: Never smoker, caffeine: None    Prior Evaluation/Treatments:  Have you seen another physician for your headaches? yes - PCP  Prior work-up: None  Trigger point injections? no  Botox injections? no  Epidural injections? no  Prior PREVENTATIVE medications: None  Prior ABORTIVE mediations: None    The following portions of the patient's history were reviewed and updated as appropriate: allergies, current medications, past family history, past medical history, past social history, past surgical history and problem list     Objective:  Blood pressure 130/70, pulse 74, height 5' 5" (1 651 m), weight 85 7 kg (189 lb)  Physical Exam  Vitals and nursing note reviewed  HENT:      Head: Normocephalic  Right Ear: Hearing normal       Left Ear: Hearing normal       Nose: Nose normal       Mouth/Throat:      Mouth: Mucous membranes are moist       Pharynx: Oropharynx is clear  No oropharyngeal exudate  Eyes:      General: Lids are normal       Extraocular Movements: Extraocular movements intact  Conjunctiva/sclera: Conjunctivae normal       Pupils: Pupils are equal, round, and reactive to light  Cardiovascular:      Rate and Rhythm: Normal rate  Pulmonary:      Effort: Pulmonary effort is normal    Musculoskeletal:         General: Signs of injury present  Normal range of motion  Cervical back: Normal range of motion  Skin:     General: Skin is warm  Capillary Refill: Capillary refill takes less than 2 seconds  Neurological:      General: No focal deficit present  Mental Status: She is alert  Motor: Motor strength is normal       Coordination: Romberg sign negative  Deep Tendon Reflexes:      Reflex Scores:       Tricep reflexes are 2+ on the right side and 2+ on the left side  Bicep reflexes are 2+ on the right side and 2+ on the left side  Brachioradialis reflexes are 2+ on the right side and 2+ on the left side  Patellar reflexes are 3+ on the right side and 3+ on the left side  Achilles reflexes are 2+ on the right side and 2+ on the left side  Psychiatric:         Mood and Affect: Mood normal          Speech: Speech normal        Neurological Exam  Mental Status  Awake, alert and oriented to person, place and time  Alert  Speech is normal  Follows three-step commands  Attention and concentration are normal     Cranial Nerves  CN II: Visual fields full to confrontation  Right funduscopic exam: not visualized  Left funduscopic exam: not visualized  CN III, IV, VI: Extraocular movements intact bilaterally  Normal lids and orbits bilaterally  Pupils equal round and reactive to light bilaterally  Right pupil: 3 mm  Round  Reactive to light  Reactive to accommodation  Left pupil: 3 mm  Round  Reactive to light  CN V:  Right: Facial sensation is normal   Left: Facial sensation is normal on the left  CN VII:  Right: There is no facial weakness  Left: There is no facial weakness    CN VIII:  Right: Hearing is normal   Left: Hearing is normal   CN IX, X:  Right: Palate is normal   Left: Palate is normal   CN XI:  Right: Sternocleidomastoid strength is normal  Trapezius strength is normal   Left: Sternocleidomastoid strength is normal  Trapezius strength is normal   CN XII: Tongue midline without atrophy or fasciculations  Motor  Normal muscle bulk throughout  No fasciculations present  Normal muscle tone  No abnormal involuntary movements  Strength is 5/5 throughout all four extremities  Strength can be limited 2/2 rotator cuff injury on RUE, however with encouragement and support 5/5 is achieved       Sensory  Light touch is normal in upper and lower extremities  Temperature is normal in upper and lower extremities  Vibration is normal in upper and lower extremities  Proprioception is normal in upper and lower extremities  Reflexes                                            Right                      Left  Brachioradialis                    2+                         2+  Biceps                                 2+                         2+  Triceps                                2+                         2+  Patellar                                3+                         3+  Achilles                                2+                         2+    Coordination  Right: Finger-to-nose normal  Rapid alternating movement normal  Heel-to-shin normal Left: Finger-to-nose normal  Rapid alternating movement normal  Heel-to-shin normal     Gait  Casual gait is normal including stance, stride, and arm swing  Normal toe walking  Normal heel walking  Normal tandem gait  Romberg is absent  Normal pull test  Able to rise from chair without using arms  ROS:  Review of Systems   Constitutional: Negative for chills and fever  HENT: Negative for ear pain and sore throat  Eyes: Negative for pain and visual disturbance  Respiratory: Negative for cough and shortness of breath  Cardiovascular: Negative for chest pain and palpitations     Gastrointestinal: Negative for abdominal pain and vomiting  Genitourinary: Negative for dysuria and hematuria  Musculoskeletal: Negative for arthralgias and back pain  Skin: Negative for color change and rash  Neurological: Negative for seizures and syncope  All other systems reviewed and are negative

## 2023-01-26 ENCOUNTER — TELEPHONE (OUTPATIENT)
Dept: NEUROLOGY | Facility: CLINIC | Age: 48
End: 2023-01-26

## 2023-01-26 NOTE — TELEPHONE ENCOUNTER
Hi, my name is Sanjuanita Hall  I need information bec I have to take a blood work over tomorrow and I need to know the address for it  Can someone please give me back a call at [phone]  Thank you  Called pt and advised that she can go any lab facility that accepts her ins  Pt states that she was not given lab script  Advised that if she goes to any  lab, she does not need the actual script as the lab facility can see the script in her chart  Pt verbalized understanding and states that she will go to [address] [place] [address] lab in [place]  Advised requires 8 hr fasting or longer  Pt verbalized understanding

## 2023-01-27 ENCOUNTER — TELEPHONE (OUTPATIENT)
Dept: FAMILY MEDICINE CLINIC | Facility: CLINIC | Age: 48
End: 2023-01-27

## 2023-01-27 ENCOUNTER — APPOINTMENT (OUTPATIENT)
Dept: LAB | Age: 48
End: 2023-01-27

## 2023-01-27 DIAGNOSIS — I10 ESSENTIAL HYPERTENSION: ICD-10-CM

## 2023-01-27 DIAGNOSIS — G44.009 CLUSTER HEADACHE: ICD-10-CM

## 2023-01-27 DIAGNOSIS — E55.9 VITAMIN D DEFICIENCY: ICD-10-CM

## 2023-01-27 LAB
25(OH)D3 SERPL-MCNC: 25.7 NG/ML (ref 30–100)
ALBUMIN SERPL BCP-MCNC: 3.3 G/DL (ref 3.5–5)
ALP SERPL-CCNC: 54 U/L (ref 46–116)
ALT SERPL W P-5'-P-CCNC: 20 U/L (ref 12–78)
ANION GAP SERPL CALCULATED.3IONS-SCNC: 5 MMOL/L (ref 4–13)
AST SERPL W P-5'-P-CCNC: 12 U/L (ref 5–45)
BILIRUB DIRECT SERPL-MCNC: 0.1 MG/DL (ref 0–0.2)
BILIRUB SERPL-MCNC: 0.43 MG/DL (ref 0.2–1)
BUN SERPL-MCNC: 11 MG/DL (ref 5–25)
CALCIUM SERPL-MCNC: 8.8 MG/DL (ref 8.3–10.1)
CHLORIDE SERPL-SCNC: 103 MMOL/L (ref 96–108)
CHOLEST SERPL-MCNC: 170 MG/DL
CO2 SERPL-SCNC: 28 MMOL/L (ref 21–32)
CREAT SERPL-MCNC: 0.8 MG/DL (ref 0.6–1.3)
ERYTHROCYTE [DISTWIDTH] IN BLOOD BY AUTOMATED COUNT: 13.1 % (ref 11.6–15.1)
GFR SERPL CREATININE-BSD FRML MDRD: 89 ML/MIN/1.73SQ M
GLUCOSE P FAST SERPL-MCNC: 86 MG/DL (ref 65–99)
HCT VFR BLD AUTO: 38.3 % (ref 34.8–46.1)
HDLC SERPL-MCNC: 65 MG/DL
HGB BLD-MCNC: 12.7 G/DL (ref 11.5–15.4)
LDLC SERPL CALC-MCNC: 89 MG/DL (ref 0–100)
MCH RBC QN AUTO: 29.1 PG (ref 26.8–34.3)
MCHC RBC AUTO-ENTMCNC: 33.2 G/DL (ref 31.4–37.4)
MCV RBC AUTO: 88 FL (ref 82–98)
NONHDLC SERPL-MCNC: 105 MG/DL
PLATELET # BLD AUTO: 291 THOUSANDS/UL (ref 149–390)
PMV BLD AUTO: 10.9 FL (ref 8.9–12.7)
POTASSIUM SERPL-SCNC: 3.7 MMOL/L (ref 3.5–5.3)
PROT SERPL-MCNC: 7.2 G/DL (ref 6.4–8.4)
RBC # BLD AUTO: 4.37 MILLION/UL (ref 3.81–5.12)
SODIUM SERPL-SCNC: 136 MMOL/L (ref 135–147)
TRIGL SERPL-MCNC: 81 MG/DL
WBC # BLD AUTO: 6.56 THOUSAND/UL (ref 4.31–10.16)

## 2023-01-27 NOTE — TELEPHONE ENCOUNTER
Patient called concerned about her lab results she just received on Mychart. Explained to patient only out of range results is Albumin and Vitamin D. Explained usually for low vitamin D, OTC vitamin D is taken- she stated she will restart it today since she was advised about this before. I explained the Albumin was low but only .2 points out of range so will send the message to the provider to go over and give any instructions if any early next week. She stated she did stop eating chicken a couple days ago and is wondering if that may be why it is abnormal.

## 2023-01-30 ENCOUNTER — TELEPHONE (OUTPATIENT)
Dept: NEUROLOGY | Facility: CLINIC | Age: 48
End: 2023-01-30

## 2023-01-30 NOTE — TELEPHONE ENCOUNTER
Received anum-Good morning, this is Scarlet Cedeno returning your phone call  Can you please give me back a call at [phone]  Thank you  Called pt, she states that someone called her about cancelling the MRA  [phone] to leave a detailed    Pt also asked about recent lab results  Reviewed normal BMP with pt       Please contact pt regarding MRA

## 2023-01-30 NOTE — TELEPHONE ENCOUNTER
I called and spoke with Tavares  I explained the MRA was denied  They are waiting on the results of the brain MRI  She understood and asked if I could cancel the MRA portion of her test for her  I then called  and spoke with Nicole Raymond  She was able to cancel  [address]

## 2023-02-02 ENCOUNTER — APPOINTMENT (OUTPATIENT)
Dept: MRI IMAGING | Facility: HOSPITAL | Age: 48
End: 2023-02-02

## 2023-02-02 ENCOUNTER — HOSPITAL ENCOUNTER (OUTPATIENT)
Dept: MRI IMAGING | Facility: HOSPITAL | Age: 48
End: 2023-02-02

## 2023-02-02 DIAGNOSIS — G44.009 CLUSTER HEADACHE: ICD-10-CM

## 2023-02-02 RX ADMIN — GADOBUTROL 8 ML: 604.72 INJECTION INTRAVENOUS at 10:13

## 2023-02-06 ENCOUNTER — TELEPHONE (OUTPATIENT)
Dept: NEUROLOGY | Facility: CLINIC | Age: 48
End: 2023-02-06

## 2023-02-06 NOTE — TELEPHONE ENCOUNTER
Alisha Medeiros, DO  You 46 minutes ago (12:47 PM)     JHOANA  Thank you for the message  Per EPIC the results are still in progress  When I receive them and there are abnormal findings I will contact patient  Thank you   Best, Dr Dominik Garcia

## 2023-02-06 NOTE — TELEPHONE ENCOUNTER
Pt left vm asking if her MRI results came back yet    [phone]    Results still pending    Called pt and made her aware of above    [phone] to leave detailed message    Please advise on results when available

## 2023-02-07 ENCOUNTER — TELEPHONE (OUTPATIENT)
Dept: OTHER | Facility: HOSPITAL | Age: 48
End: 2023-02-07

## 2023-02-07 DIAGNOSIS — G44.009 CLUSTER HEADACHE: Primary | ICD-10-CM

## 2023-02-07 NOTE — TELEPHONE ENCOUNTER
MRI brain w wo contrast  Result Date: 2/6/2023  Impression: Cerebellar tonsils descend 5 mm below the foramen magnum with crowding at the foramen magnum compatible with Chiari I malformation  A few scattered nonspecific white matter foci in the deep white matter the supratentorial brain bilaterally nonspecific however in light of the clinical history, the sequela of complicated migraine headache should be considered  The study was marked in EPIC for significant notification  Workstation performed: [id number]     Patient was called this morning in regards to the significant findings (as indicated above) of the recent MRI of the brain with and without contrast that demonstrated a Chiari I malformation  The patient was guided, as best as possible over the phone, as to where the cerebellar tonsils, and foramen magnum are, and what this would look like on imaging  The patient was also educated on what the finding of a Chiari type I malformation means in her clinical setting, and that Chiari type I findings are generally not an acute occurrence, however a congenital finding  We do not have prior imaging for comparison  The other finding of nonspecific white matter foci in the deep white matter was also discussed in the setting of migraine headaches  In regards to further clinical management, I discussed how the most appropriate evaluation at this time will be by neurosurgery  The patient was amiable for a referral and it was subsequently placed  All questions and concerns answered to the best of my ability  No further questions  Patient appreciative

## 2023-02-07 NOTE — TELEPHONE ENCOUNTER
Marisol Kirby, DO  You 49 minutes ago (2:23 PM)     JHONAA  Patient called, MRI results discussed   Referral to neurosurgery was placed   Please see telephone note for more details

## 2023-02-07 NOTE — TELEPHONE ENCOUNTER
Pt called again regarding her MRI brain results    CB#[phone]  Ok to leave detailed message     Called pt back and made her aware that we got her message and will send it to MD    MRI brain results are now available,please advise

## 2023-02-27 DIAGNOSIS — I10 ESSENTIAL HYPERTENSION: ICD-10-CM

## 2023-02-27 RX ORDER — AMLODIPINE BESYLATE 5 MG/1
5 TABLET ORAL DAILY
Qty: 90 TABLET | Refills: 1 | Status: SHIPPED | OUTPATIENT
Start: 2023-02-27 | End: 2023-03-29

## 2023-02-27 RX ORDER — LOSARTAN POTASSIUM 100 MG/1
100 TABLET ORAL DAILY
Qty: 90 TABLET | Refills: 1 | Status: SHIPPED | OUTPATIENT
Start: 2023-02-27 | End: 2023-03-29

## 2023-02-27 RX ORDER — HYDROCHLOROTHIAZIDE 25 MG/1
25 TABLET ORAL DAILY
Qty: 90 TABLET | Refills: 1 | Status: SHIPPED | OUTPATIENT
Start: 2023-02-27 | End: 2023-03-29

## 2023-03-06 PROBLEM — G93.5 CHIARI MALFORMATION TYPE I (HCC): Status: ACTIVE | Noted: 2023-03-06

## 2023-03-06 NOTE — ASSESSMENT & PLAN NOTE
Presents as referral for evaluation of Chiari malformation  · Noted incidentally on MRI brain completed as part of workup for headaches in February, 2023  · Follows with neurologist Dr Christin Barber - started on triptan PRN for headache  No papilledema  Diagnosed with cluster headaches or hemicrania continua  · Has not tried sumatriptan due to concerns for side effects  · Describes intermittent sharp pain to left parietal head that last a few moments and then resolve spontaneously  · History of hypertension managed on amlodipine, lisinopril, HCTZ  · Exam is non-focal      Imaging:  · MRI brain w/wo, 2/2/2023: Cerebellar tonsils descend 5 mm below the foramen magnum with crowding at the foramen magnum compatible with Chiari I malformation  A few scattered nonspecific white matter foci in the deep white matter the supratentorial brain bilaterally nonspecific however in light of the clinical history, the sequela of complicated migraine headache should be considered  Plan:  · Reviewed imaging extensively with patient  Discussed that tonsillar descent is borderline  Further discussed that no evidence of CMJ CSF stenosis or kinking  No evidence of syrinx  Discussed that symptoms of sharp intermittent headaches, occasional dizziness, are unlikely related to Chiari malformation  Reviewed options for treatment including suboccipital craniectomy  Discussed that procedure is reserved for severe cases refractory to conservative treatment and usually with evidence of CSF flow malfunction  Agree with plan for MRA to r/o vascular abnormalities  Follow up as scheduled with neurology in June  No further neurosurgical follow up needed  Follow up PRN

## 2023-03-07 ENCOUNTER — OFFICE VISIT (OUTPATIENT)
Dept: NEUROSURGERY | Facility: CLINIC | Age: 48
End: 2023-03-07

## 2023-03-07 VITALS
WEIGHT: 185.4 LBS | HEIGHT: 65 IN | DIASTOLIC BLOOD PRESSURE: 78 MMHG | BODY MASS INDEX: 30.89 KG/M2 | OXYGEN SATURATION: 99 % | HEART RATE: 79 BPM | SYSTOLIC BLOOD PRESSURE: 132 MMHG | TEMPERATURE: 97.8 F | RESPIRATION RATE: 19 BRPM

## 2023-03-07 DIAGNOSIS — G93.5 CHIARI MALFORMATION TYPE I (HCC): Primary | ICD-10-CM

## 2023-03-07 DIAGNOSIS — G44.009 CLUSTER HEADACHE: ICD-10-CM

## 2023-03-07 NOTE — PROGRESS NOTES
Neurosurgery Office Note  Ronald Govea 41 y o  female MRN: [id number]      Assessment/Plan     Chiari malformation type I [facility])  Presents as referral for evaluation of Chiari malformation  · Noted incidentally on MRI brain completed as part of workup for headaches in February, 2023  · Follows with neurologist Dr Ora Ewing - started on triptan PRN for headache  No papilledema  Diagnosed with cluster headaches or hemicrania continua  · Has not tried sumatriptan due to concerns for side effects  · Describes intermittent sharp pain to left parietal head that last a few moments and then resolve spontaneously  · History of hypertension managed on amlodipine, lisinopril, HCTZ  · Exam is non-focal      Imaging:  · MRI brain w/wo, 2/2/2023: Cerebellar tonsils descend 5 mm below the foramen magnum with crowding at the foramen magnum compatible with Chiari I malformation  A few scattered nonspecific white matter foci in the deep white matter the supratentorial brain bilaterally nonspecific however in light of the clinical history, the sequela of complicated migraine headache should be considered  Plan:  · Reviewed imaging extensively with patient  Discussed that tonsillar descent is borderline  Further discussed that no evidence of CMJ CSF stenosis or kinking  No evidence of syrinx  Discussed that symptoms of sharp intermittent headaches, occasional dizziness, are unlikely related to Chiari malformation  Reviewed options for treatment including suboccipital craniectomy  Discussed that procedure is reserved for severe cases refractory to conservative treatment and usually with evidence of CSF flow malfunction  Agree with plan for MRA to r/o vascular abnormalities  Follow up as scheduled with neurology in June  No further neurosurgical follow up needed  Follow up PRN         Diagnoses and all orders for this visit:    Chiari malformation type I [facility])    Cluster headache  -     Ambulatory Referral to Neurosurgery          I have spent a total time of 30 minutes on 03/07/23 in caring for this patient including Diagnostic results, Prognosis, Risks and benefits of tx options, Instructions for management, Patient and family education, Impressions, Counseling / Coordination of care, Documenting in the medical record, Reviewing / ordering tests, medicine, procedures   and Obtaining or reviewing history    CHIEF COMPLAINT    Chief Complaint   Patient presents with   • Consult       HISTORY    History of Present Illness     41y o  year old female     With past medical history of hypertension on baby aspirin, who presents as referral from neurology for evaluation of 5 mm cerebellar tonsillar descent  She was evaluated by neurologist Dr Yeni Vega on 1/19/2023 due to frequent headaches  She states for about a year prior to evaluation she began experiencing sharp left-sided parietotemporal pains that lasted a few moments and then resolved spontaneously  At first she thought these were related to stress as she began picking up a lot of extra shifts at her job and was not eating enough or waiting too long in the day to eat  Occasionally she also experiences bouts of dizziness and lightheadedness which she feels are also associated with not eating  She states that headaches occur approximately once or twice a month  Neurologist referred her to obtain MRI brain as well as an MRA  MRA has yet to be scheduled  She was prescribed triptan for cluster headaches but states she has been concerned about the side effects listed for this medication and so has not tried it  She denies any numbness or weakness  She denies worsening of symptoms with coughing or bowel movements  She denies any difficulty with ambulation  She has no issues with fine motor tasks in her hands  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  She lives alone  She has 2 children ages 21 and 32        See Discussion    REVIEW OF SYSTEMS    Review of Systems HENT: Positive for tinnitus (both ears at times )  Eyes: Negative  Respiratory: Negative  Cardiovascular: Positive for palpitations (at times thinks its anxiety)  Gastrointestinal: Negative  Endocrine: Negative  Genitourinary: Negative  Musculoskeletal: Negative  Skin: Negative  Neurological: Positive for dizziness (ocassionally), light-headedness (at times) and headaches (ocassionally)  Negative for tremors, seizures, syncope, speech difficulty, weakness and numbness  Hematological: Bruises/bleeds easily (medication)  Psychiatric/Behavioral: Negative for self-injury  ROS obtained by MA  Reviewed  See HPI  Meds/Allergies     Current Outpatient Medications   Medication Sig Dispense Refill   • amLODIPine (NORVASC) 5 mg tablet Take 1 tablet (5 mg total) by mouth daily 90 tablet 1   • Ascorbic Acid (VITAMIN C PO) Take by mouth     • aspirin 81 MG tablet Take 81 mg by mouth     • hydrochlorothiazide (HYDRODIURIL) 25 mg tablet Take 1 tablet (25 mg total) by mouth daily 90 tablet 1   • losartan (COZAAR) 100 MG tablet Take 1 tablet (100 mg total) by mouth daily 90 tablet 1   • Multiple Vitamins-Minerals (AIRBORNE PO) Take by mouth     • SUMAtriptan (Imitrex) 100 mg tablet Take 0 5 tablets as needed for headache  Can repeat after 2 hours if headache does not resolve, please do not take more than 2 doses in 24 hours  (Patient not taking: Reported on 3/7/2023) 9 tablet 3     No current facility-administered medications for this visit  No Known Allergies    PAST HISTORY    Past Medical History:   Diagnosis Date   • Fibroids    • Hypertension    • Migraine        History reviewed  No pertinent surgical history      Social History     Tobacco Use   • Smoking status: Never   • Smokeless tobacco: Never   Substance Use Topics   • Alcohol use: Yes     Comment: occassional    • Drug use: Not Currently       Family History   Problem Relation Age of Onset   • Hypertension Mother    • Prostate cancer Father          Above history personally reviewed  EXAM    Vitals:Blood pressure 132/78, pulse 79, temperature 97 8 °F (36 6 °C), resp  rate 19, height 5' 5" (1 651 m), weight 84 1 kg (185 lb 6 4 oz), SpO2 99 %  ,Body mass index is 30 85 kg/m²  Physical Exam  Constitutional:       Appearance: She is well-developed  HENT:      Head: Normocephalic and atraumatic  Eyes:      Extraocular Movements: EOM normal       Pupils: Pupils are equal, round, and reactive to light  Pulmonary:      Effort: Pulmonary effort is normal    Abdominal:      Palpations: Abdomen is soft  Musculoskeletal:         General: Normal range of motion  Cervical back: Normal range of motion and neck supple  Skin:     General: Skin is warm and dry  Neurological:      General: No focal deficit present  Mental Status: She is alert and oriented to person, place, and time  Mental status is at baseline  Cranial Nerves: Cranial nerves 2-12 are intact  No cranial nerve deficit  Sensory: No sensory deficit  Motor: No weakness  Coordination: Coordination normal  Finger-Nose-Finger Test normal    Psychiatric:         Speech: Speech normal          Neurologic Exam     Mental Status   Oriented to person, place, and time  Oriented to person  Oriented to place  Oriented to time  Oriented to year, month and date  Registration: recalls 3 of 3 objects  Attention: normal  Concentration: normal    Speech: speech is normal   Level of consciousness: alert  Knowledge: good and consistent with education  Able to name object  Cranial Nerves   Cranial nerves II through XII intact  CN III, IV, VI   Pupils are equal, round, and reactive to light  Extraocular motions are normal    Right pupil: Size: 3 mm  Shape: regular  Reactivity: brisk  Consensual response: intact  Accommodation: intact  Left pupil: Size: 3 mm  Shape: regular  Reactivity: brisk  Consensual response: intact  Accommodation: intact  Nystagmus: none   Diplopia: none  Conjugate gaze: present    CN V   Right facial sensation deficit: none  Left facial sensation deficit: none    CN VII   Facial expression full, symmetric  CN VIII   Hearing: intact    CN IX, X   Palate: symmetric    CN XI   Right sternocleidomastoid strength: normal  Left sternocleidomastoid strength: normal  Right trapezius strength: normal  Left trapezius strength: normal    CN XII   Tongue: not atrophic  Fasciculations: absent  Tongue deviation: none    Motor Exam   Muscle bulk: normal  Overall muscle tone: normal  Right arm pronator drift: absent  Left arm pronator drift: absent    Strength   Right deltoid: 5/5  Left deltoid: 5/5  Right biceps: 5/5  Left biceps: 5/5  Right triceps: 5/5  Left triceps: 5/5  Right quadriceps: 5/5  Left quadriceps: 5/5  Right hamstrin/5  Left hamstrin/5  Right anterior tibial: 5/5  Left anterior tibial: 5/5  Right posterior tibial: 5/5  Left posterior tibial: 5/5  Right peroneal: 5/5  Left peroneal: 5/5  Right gastroc: 5/5  Left gastroc: 5/5    Sensory Exam   Light touch normal    Proprioception normal      Gait, Coordination, and Reflexes     Coordination   Finger to nose coordination: normal    Tremor   Resting tremor: absent  Intention tremor: absent  Action tremor: absent        MEDICAL DECISION MAKING    Imaging Studies:     No results found  I have personally reviewed pertinent reports     and I have personally reviewed pertinent films in PACS

## 2023-03-09 ENCOUNTER — TELEPHONE (OUTPATIENT)
Dept: NEUROLOGY | Facility: CLINIC | Age: 48
End: 2023-03-09

## 2023-03-09 NOTE — TELEPHONE ENCOUNTER
I attempted to reach out to patient in regards to her medication sumatriptan  Patient did not answer phone  Will attempt at a different time

## 2023-03-10 ENCOUNTER — TELEPHONE (OUTPATIENT)
Dept: OTHER | Facility: HOSPITAL | Age: 48
End: 2023-03-10

## 2023-03-10 NOTE — TELEPHONE ENCOUNTER
I reached patient today via telephone call today to talk about a few things  The patient was recently seen by neurosurgery, and they also agree of obtaining an MRA, which was previously ordered  I encouraged the patient to call the office/clinic, to help get this scheduled  The patient remains hesitant in regards to her sumatriptan and reports that she has not taken this medication quite yet because of concerns regarding side effects  I reiterated that the most common side effects of sumatriptan includes dizziness, vertigo, nausea and vomiting, a flushing sensation, tingling sensation, and an unpleasant taste  I also spoke again about the midsternal chest pressure that the patient might feel transiently when taking the medication  The patient was encouraged when she does have a migraine headache, to attempt taking the medication at least once to evaluate therapeutic value  All questions and concerns addressed to the best my ability

## 2023-05-10 ENCOUNTER — OFFICE VISIT (OUTPATIENT)
Dept: FAMILY MEDICINE CLINIC | Facility: CLINIC | Age: 48
End: 2023-05-10

## 2023-05-10 VITALS
WEIGHT: 183 LBS | TEMPERATURE: 98.5 F | BODY MASS INDEX: 30.49 KG/M2 | HEART RATE: 72 BPM | HEIGHT: 65 IN | SYSTOLIC BLOOD PRESSURE: 128 MMHG | RESPIRATION RATE: 18 BRPM | OXYGEN SATURATION: 99 % | DIASTOLIC BLOOD PRESSURE: 75 MMHG

## 2023-05-10 DIAGNOSIS — I10 ESSENTIAL HYPERTENSION: ICD-10-CM

## 2023-05-10 DIAGNOSIS — Z11.59 SCREENING FOR VIRAL DISEASE: ICD-10-CM

## 2023-05-10 DIAGNOSIS — Z12.31 SCREENING MAMMOGRAM FOR BREAST CANCER: ICD-10-CM

## 2023-05-10 DIAGNOSIS — Z12.11 SCREENING FOR MALIGNANT NEOPLASM OF COLON: ICD-10-CM

## 2023-05-10 DIAGNOSIS — Z00.00 ANNUAL PHYSICAL EXAM: Primary | ICD-10-CM

## 2023-05-10 RX ORDER — LOSARTAN POTASSIUM 100 MG/1
100 TABLET ORAL DAILY
Qty: 90 TABLET | Refills: 1 | Status: SHIPPED | OUTPATIENT
Start: 2023-05-10 | End: 2023-06-09

## 2023-05-10 RX ORDER — AMLODIPINE BESYLATE 5 MG/1
5 TABLET ORAL DAILY
Qty: 90 TABLET | Refills: 1 | Status: SHIPPED | OUTPATIENT
Start: 2023-05-10 | End: 2023-06-09

## 2023-05-10 RX ORDER — HYDROCHLOROTHIAZIDE 25 MG/1
25 TABLET ORAL DAILY
Qty: 90 TABLET | Refills: 1 | Status: SHIPPED | OUTPATIENT
Start: 2023-05-10 | End: 2023-06-09

## 2023-05-10 NOTE — PROGRESS NOTES
[facility]    NAME: Susanna Gonzalez  AGE: 41 y o  SEX: female  : 1975     DATE: 5/10/2023     Assessment and Plan:   BMI Counseling: Body mass index is 30 45 kg/m²  The BMI is above normal  Nutrition recommendations include reducing portion sizes and decreasing overall calorie intake  Problem List Items Addressed This Visit    None  Visit Diagnoses     Annual physical exam    -  Primary    Screening mammogram for breast cancer        Relevant Orders    Mammo screening bilateral w 3d & cad    Screening for malignant neoplasm of colon        Relevant Orders    Cologuard    Screening for viral disease        Relevant Orders    Hepatitis C antibody    : HIV 1/2 AB/AG w Reflex [facility] for 2 yr old and above    Essential hypertension        Relevant Medications    losartan (COZAAR) 100 MG tablet    hydrochlorothiazide (HYDRODIURIL) 25 mg tablet    amLODIPine (NORVASC) 5 mg tablet          Immunizations and preventive care screenings were discussed with patient today  Appropriate education was printed on patient's after visit summary  Counseling:  Alcohol/drug use: discussed moderation in alcohol intake, the recommendations for healthy alcohol use, and avoidance of illicit drug use  Dental Health: discussed importance of regular tooth brushing, flossing, and dental visits  Sexual health: discussed sexually transmitted diseases, partner selection, use of condoms, avoidance of unintended pregnancy, and contraceptive alternatives  · Exercise: the importance of regular exercise/physical activity was discussed  Recommend exercise 3-5 times per week for at least 30 minutes  Depression Screening and Follow-up Plan: Patient was screened for depression during today's encounter  They screened negative with a PHQ-2 score of 0  Return in about 6 months (around 11/10/2023)       Chief Complaint:     Chief Complaint   Patient presents with   • Annual Exam   • Vitamin D Deficiency   • Nausea     Started a week ago, patient was on vacation and drank a bottle of wine and feel nauseous the following day   • Hypertension      History of Present Illness:     Adult Annual Physical   Patient here for a comprehensive physical exam  The patient reports no problems  Diet and Physical Activity  · Diet/Nutrition: well balanced diet  · Exercise: walking  Depression Screening  PHQ-2/9 Depression Screening    Little interest or pleasure in doing things: 0 - not at all  Feeling down, depressed, or hopeless: 0 - not at all  PHQ-2 Score: 0  PHQ-2 Interpretation: Negative depression screen       General Health  · Sleep: gets 4-6 hours of sleep on average  · Hearing: normal - bilateral   · Vision: wears glasses  · Dental: no dental visits for >1 year  /GYN Health  · Patient is: postmenopausal  · Last menstrual period:   ·   · Contraceptive method: barrier methods  Review of Systems:     Review of Systems   Constitutional: Negative  HENT: Negative  Eyes: Negative  Respiratory: Negative  Cardiovascular: Negative  Gastrointestinal: Negative  Genitourinary: Negative  Musculoskeletal: Negative  Skin: Negative  Neurological: Negative  Psychiatric/Behavioral: Negative  Past Medical History:     Past Medical History:   Diagnosis Date   • Fibroids    • Hypertension    • Migraine       Past Surgical History:     History reviewed  No pertinent surgical history     Social History:     Social History     Socioeconomic History   • Marital status: /Civil Union     Spouse name: None   • Number of children: None   • Years of education: None   • Highest education level: None   Occupational History     Employer: [address] [place] [address]   Tobacco Use   • Smoking status: Never   • Smokeless tobacco: Never   Substance and Sexual Activity   • Alcohol use: Yes     Comment: occassional    • Drug use: Not Currently   • Sexual "activity: None   Other Topics Concern   • None   Social History Narrative   • None     Social Determinants of Health     Financial Resource Strain: Not on file   Food Insecurity: Not on file   Transportation Needs: Not on file   Physical Activity: Not on file   Stress: Not on file   Social Connections: Not on file   Intimate Partner Violence: Not on file   Housing Stability: Not on file      Family History:     Family History   Problem Relation Age of Onset   • Hypertension Mother    • Prostate cancer Father       Current Medications:     Current Outpatient Medications   Medication Sig Dispense Refill   • amLODIPine (NORVASC) 5 mg tablet Take 1 tablet (5 mg total) by mouth daily 90 tablet 1   • Ascorbic Acid (VITAMIN C PO) Take by mouth     • aspirin 81 MG tablet Take 81 mg by mouth     • hydrochlorothiazide (HYDRODIURIL) 25 mg tablet Take 1 tablet (25 mg total) by mouth daily 90 tablet 1   • losartan (COZAAR) 100 MG tablet Take 1 tablet (100 mg total) by mouth daily 90 tablet 1   • Multiple Vitamins-Minerals (AIRBORNE PO) Take by mouth     • SUMAtriptan (Imitrex) 100 mg tablet Take 0 5 tablets as needed for headache  Can repeat after 2 hours if headache does not resolve, please do not take more than 2 doses in 24 hours  (Patient not taking: Reported on 3/7/2023) 9 tablet 3     No current facility-administered medications for this visit  Allergies:     No Known Allergies   Physical Exam:     /75   Pulse 72   Temp 98 5 °F (36 9 °C)   Resp 18   Ht 5' 5\" (1 651 m)   Wt 83 kg (183 lb)   SpO2 99%   BMI 30 45 kg/m²     Physical Exam  Vitals and nursing note reviewed  Constitutional:       General: She is not in acute distress  Appearance: She is well-developed  HENT:      Head: Normocephalic and atraumatic        Right Ear: Tympanic membrane, ear canal and external ear normal       Left Ear: Tympanic membrane, ear canal and external ear normal       Nose: Nose normal       Mouth/Throat:      " Mouth: Mucous membranes are moist       Pharynx: Oropharynx is clear  Eyes:      Conjunctiva/sclera: Conjunctivae normal       Pupils: Pupils are equal, round, and reactive to light  Cardiovascular:      Rate and Rhythm: Normal rate and regular rhythm  Pulses: Normal pulses  Heart sounds: Normal heart sounds  No murmur heard  Pulmonary:      Effort: Pulmonary effort is normal  No respiratory distress  Breath sounds: Normal breath sounds  Abdominal:      General: Abdomen is flat  Bowel sounds are normal       Palpations: Abdomen is soft  Tenderness: There is no abdominal tenderness  Musculoskeletal:         General: No swelling  Cervical back: Neck supple  Skin:     General: Skin is warm and dry  Capillary Refill: Capillary refill takes less than 2 seconds  Neurological:      Mental Status: She is alert and oriented to person, place, and time  Psychiatric:         Mood and Affect: Mood normal          Behavior: Behavior normal          Thought Content:  Thought content normal          Judgment: Judgment normal           Isabelle Cope, DO  [facility]

## 2023-05-10 NOTE — PROGRESS NOTES
Name: Rachel Brown      : 1975      MRN: [id number]  Encounter Provider: Rody Bunn DO  Encounter Date: 5/10/2023   Encounter department: [facility]    Assessment & Plan       Weight loss - Calories and grams of CHO's   !500 hector, and <100 g of CHO's daily  Eat small portions 4 - 6 times a day  Chief Complaint   Patient presents with   • Annual Exam   • Vitamin D Deficiency   • Nausea     Started a week ago, patient was on vacation and drank a bottle of wine and feel nauseous the following day   • Hypertension     1  Annual physical exam    2  Screening mammogram for breast cancer  -     Mammo screening bilateral w 3d & cad; Future; Expected date: 05/10/2023    3  Screening for malignant neoplasm of colon  -     Cologuard    4  Screening for viral disease  -     Hepatitis C antibody; Future  -     : HIV 1/2 AB/AG w Reflex [facility] for 2 yr old and above; Future    5  Essential hypertension  -     losartan (COZAAR) 100 MG tablet; Take 1 tablet (100 mg total) by mouth daily  -     hydrochlorothiazide (HYDRODIURIL) 25 mg tablet; Take 1 tablet (25 mg total) by mouth daily  -     amLODIPine (NORVASC) 5 mg tablet; Take 1 tablet (5 mg total) by mouth daily           Subjective     Review labs and refills  No recent HA's  Interested in weight loss  I have spent a total time of 30 minutes on 05/10/23 in caring for this patient including Diagnostic results, Risks and benefits of tx options, Instructions for management, Patient and family education, Risk factor reductions and Impressions  Review of Systems   Constitutional: Negative  HENT: Negative  Eyes: Negative  Respiratory: Negative  Cardiovascular: Negative  Gastrointestinal: Negative  Genitourinary: Negative  Musculoskeletal: Negative  Skin: Negative  Neurological: Negative  Psychiatric/Behavioral: Negative          Past Medical History:   Diagnosis Date   • Fibroids    • Hypertension    • Migraine History reviewed  No pertinent surgical history  Family History   Problem Relation Age of Onset   • Hypertension Mother    • Prostate cancer Father      Social History     Socioeconomic History   • Marital status: /Civil Union     Spouse name: None   • Number of children: None   • Years of education: None   • Highest education level: None   Occupational History     Employer: [address] [place] [address]   Tobacco Use   • Smoking status: Never   • Smokeless tobacco: Never   Substance and Sexual Activity   • Alcohol use: Yes     Comment: occassional    • Drug use: Not Currently   • Sexual activity: None   Other Topics Concern   • None   Social History Narrative   • None     Social Determinants of Health     Financial Resource Strain: Not on file   Food Insecurity: Not on file   Transportation Needs: Not on file   Physical Activity: Not on file   Stress: Not on file   Social Connections: Not on file   Intimate Partner Violence: Not on file   Housing Stability: Not on file     Current Outpatient Medications on File Prior to Visit   Medication Sig   • Ascorbic Acid (VITAMIN C PO) Take by mouth   • aspirin 81 MG tablet Take 81 mg by mouth   • Multiple Vitamins-Minerals (AIRBORNE PO) Take by mouth   • [DISCONTINUED] amLODIPine (NORVASC) 5 mg tablet Take 1 tablet (5 mg total) by mouth daily   • [DISCONTINUED] hydrochlorothiazide (HYDRODIURIL) 25 mg tablet Take 1 tablet (25 mg total) by mouth daily   • [DISCONTINUED] losartan (COZAAR) 100 MG tablet Take 1 tablet (100 mg total) by mouth daily   • SUMAtriptan (Imitrex) 100 mg tablet Take 0 5 tablets as needed for headache  Can repeat after 2 hours if headache does not resolve, please do not take more than 2 doses in 24 hours   (Patient not taking: Reported on 3/7/2023)     No Known Allergies  Immunization History   Administered Date(s) Administered   • COVID-19 MODERNA VACC 0 5 ML IM 10/20/2021, 11/17/2021       Objective     /75   Pulse 72   Temp 98 5 °F (36 9 "°C)   Resp 18   Ht 5' 5\" (1 651 m)   Wt 83 kg (183 lb)   SpO2 99%   BMI 30 45 kg/m²     Physical Exam  Constitutional:       Appearance: She is well-developed  HENT:      Head: Normocephalic and atraumatic  Right Ear: External ear normal       Left Ear: External ear normal       Nose: Nose normal       Mouth/Throat:      Mouth: Mucous membranes are moist       Pharynx: Oropharynx is clear  Eyes:      Conjunctiva/sclera: Conjunctivae normal       Pupils: Pupils are equal, round, and reactive to light  Cardiovascular:      Rate and Rhythm: Normal rate and regular rhythm  Heart sounds: Normal heart sounds  Pulmonary:      Effort: Pulmonary effort is normal       Breath sounds: Normal breath sounds  Abdominal:      General: Abdomen is flat  Bowel sounds are normal       Palpations: Abdomen is soft  Musculoskeletal:      Cervical back: Normal range of motion and neck supple  Skin:     General: Skin is warm and dry  Neurological:      Mental Status: She is alert and oriented to person, place, and time  Deep Tendon Reflexes: Reflexes are normal and symmetric  Psychiatric:         Mood and Affect: Mood normal          Behavior: Behavior normal          Thought Content:  Thought content normal          Judgment: Judgment normal        Ruthton Barefoot, DO  "

## 2023-06-15 ENCOUNTER — OCCMED (OUTPATIENT)
Dept: URGENT CARE | Age: 48
End: 2023-06-15
Payer: OTHER MISCELLANEOUS

## 2023-06-15 DIAGNOSIS — S30.0XXA CONTUSION OF BUTTOCK, INITIAL ENCOUNTER: Primary | ICD-10-CM

## 2023-06-15 DIAGNOSIS — S70.02XA CONTUSION OF LEFT HIP, INITIAL ENCOUNTER: ICD-10-CM

## 2023-06-15 PROCEDURE — 99283 EMERGENCY DEPT VISIT LOW MDM: CPT | Performed by: NURSE PRACTITIONER

## 2023-06-15 PROCEDURE — G0382 LEV 3 HOSP TYPE B ED VISIT: HCPCS | Performed by: NURSE PRACTITIONER

## 2023-06-20 ENCOUNTER — OFFICE VISIT (OUTPATIENT)
Dept: FAMILY MEDICINE CLINIC | Facility: CLINIC | Age: 48
End: 2023-06-20
Payer: MEDICARE

## 2023-06-20 VITALS
RESPIRATION RATE: 18 BRPM | WEIGHT: 187 LBS | HEART RATE: 70 BPM | OXYGEN SATURATION: 99 % | BODY MASS INDEX: 31.16 KG/M2 | HEIGHT: 65 IN | DIASTOLIC BLOOD PRESSURE: 78 MMHG | TEMPERATURE: 98 F | SYSTOLIC BLOOD PRESSURE: 128 MMHG

## 2023-06-20 DIAGNOSIS — S80.12XD CONTUSION OF MULTIPLE SITES OF LEFT LOWER EXTREMITY, SUBSEQUENT ENCOUNTER: Primary | ICD-10-CM

## 2023-06-20 PROCEDURE — 99213 OFFICE O/P EST LOW 20 MIN: CPT | Performed by: FAMILY MEDICINE

## 2023-06-20 NOTE — LETTER
June 20, 2023     Patient: Roel Jennings  YOB: 1975  Date of Visit: 6/20/2023      To Whom it May Concern:    Roel Jennings is under my professional care  Katherynre Holter was seen in my office on 6/20/2023  Diedre Holter may return to work on 6/22/23 without restrictions   Diedre Holter is not allowed to work close to smoke or smoker until further notice from her doctor  If you have any questions or concerns, please don't hesitate to call           Sincerely,          Sharonda Godwin, DO

## 2023-06-20 NOTE — PROGRESS NOTES
Name: Leonel Vargas      : 1975      MRN: [id number]  Encounter Provider: Colleen Dey DO  Encounter Date: 2023   Encounter department: [facility]    Assessment & Plan   BMI Counseling: Body mass index is 31 12 kg/m²  The BMI is above normal  Nutrition recommendations include reducing portion sizes and consuming healthier snacks  Chief Complaint   Patient presents with   • Urgent care follow up 6/15/23     Contusion of buttock  Contusion of left hip       1  Contusion of multiple sites of left lower extremity, subsequent encounter           Subjective     Contusion left lateral buttock and hip - chair tipped over leaning to the left  Ready to go back to regular duties  Wiley's sister  Would like note not to be around smoke  SH: CNA  Review of Systems   Musculoskeletal: Negative for gait problem  Past Medical History:   Diagnosis Date   • Fibroids    • Hypertension    • Migraine      No past surgical history on file    Family History   Problem Relation Age of Onset   • Hypertension Mother    • Prostate cancer Father      Social History     Socioeconomic History   • Marital status: /Civil Union     Spouse name: None   • Number of children: None   • Years of education: None   • Highest education level: None   Occupational History     Employer: [address] [place] [address]   Tobacco Use   • Smoking status: Never   • Smokeless tobacco: Never   Substance and Sexual Activity   • Alcohol use: Yes     Comment: occassional    • Drug use: Not Currently   • Sexual activity: None   Other Topics Concern   • None   Social History Narrative   • None     Social Determinants of Health     Financial Resource Strain: Not on file   Food Insecurity: Not on file   Transportation Needs: Not on file   Physical Activity: Not on file   Stress: Not on file   Social Connections: Not on file   Intimate Partner Violence: Not on file   Housing Stability: Not on file     Current Outpatient "Medications on File Prior to Visit   Medication Sig   • amLODIPine (NORVASC) 5 mg tablet Take 1 tablet (5 mg total) by mouth daily   • Ascorbic Acid (VITAMIN C PO) Take by mouth   • aspirin 81 MG tablet Take 81 mg by mouth   • hydrochlorothiazide (HYDRODIURIL) 25 mg tablet Take 1 tablet (25 mg total) by mouth daily   • losartan (COZAAR) 100 MG tablet Take 1 tablet (100 mg total) by mouth daily   • Multiple Vitamins-Minerals (AIRBORNE PO) Take by mouth   • SUMAtriptan (Imitrex) 100 mg tablet Take 0 5 tablets as needed for headache  Can repeat after 2 hours if headache does not resolve, please do not take more than 2 doses in 24 hours  (Patient not taking: Reported on 3/7/2023)     No Known Allergies  Immunization History   Administered Date(s) Administered   • COVID-19 MODERNA VACC 0 5 ML IM 10/20/2021, 11/17/2021       Objective     /78 (BP Location: Left arm, Patient Position: Sitting, Cuff Size: Large)   Pulse 70   Temp 98 °F (36 7 °C) (Temporal)   Resp 18   Ht 5' 5\" (1 651 m)   Wt 84 8 kg (187 lb)   SpO2 99%   BMI 31 12 kg/m²     Physical Exam  Musculoskeletal:      Comments: Neg hematoma palpated  FROM  Neurological:      Mental Status: She is alert and oriented to person, place, and time  Psychiatric:         Mood and Affect: Mood normal          Behavior: Behavior normal          Thought Content:  Thought content normal          Judgment: Judgment normal        Orin Andrade, DO  "

## 2023-06-21 ENCOUNTER — APPOINTMENT (OUTPATIENT)
Dept: LAB | Facility: CLINIC | Age: 48
End: 2023-06-21
Payer: MEDICARE

## 2023-06-21 DIAGNOSIS — Z01.84 IMMUNITY STATUS TESTING: Primary | ICD-10-CM

## 2023-06-21 DIAGNOSIS — Z01.84 IMMUNITY STATUS TESTING: ICD-10-CM

## 2023-06-21 PROCEDURE — 86480 TB TEST CELL IMMUN MEASURE: CPT

## 2023-06-21 PROCEDURE — 36415 COLL VENOUS BLD VENIPUNCTURE: CPT

## 2023-06-22 LAB
GAMMA INTERFERON BACKGROUND BLD IA-ACNC: 0.05 IU/ML
M TB IFN-G BLD-IMP: NEGATIVE
M TB IFN-G CD4+ BCKGRND COR BLD-ACNC: 0 IU/ML
M TB IFN-G CD4+ BCKGRND COR BLD-ACNC: 0.01 IU/ML
MITOGEN IGNF BCKGRD COR BLD-ACNC: >10 IU/ML

## 2023-07-13 ENCOUNTER — OFFICE VISIT (OUTPATIENT)
Dept: URGENT CARE | Facility: CLINIC | Age: 48
End: 2023-07-13
Payer: MEDICARE

## 2023-07-13 ENCOUNTER — APPOINTMENT (OUTPATIENT)
Dept: RADIOLOGY | Facility: CLINIC | Age: 48
End: 2023-07-13
Payer: MEDICARE

## 2023-07-13 VITALS
DIASTOLIC BLOOD PRESSURE: 60 MMHG | OXYGEN SATURATION: 94 % | HEART RATE: 74 BPM | RESPIRATION RATE: 18 BRPM | TEMPERATURE: 98.5 F | SYSTOLIC BLOOD PRESSURE: 142 MMHG

## 2023-07-13 DIAGNOSIS — M54.50 ACUTE MIDLINE LOW BACK PAIN WITHOUT SCIATICA: Primary | ICD-10-CM

## 2023-07-13 DIAGNOSIS — M54.9 UPPER BACK PAIN: ICD-10-CM

## 2023-07-13 DIAGNOSIS — J02.9 SORE THROAT: ICD-10-CM

## 2023-07-13 LAB — S PYO AG THROAT QL: NEGATIVE

## 2023-07-13 PROCEDURE — 87070 CULTURE OTHR SPECIMN AEROBIC: CPT

## 2023-07-13 PROCEDURE — 99213 OFFICE O/P EST LOW 20 MIN: CPT

## 2023-07-13 PROCEDURE — 71046 X-RAY EXAM CHEST 2 VIEWS: CPT

## 2023-07-13 PROCEDURE — 87880 STREP A ASSAY W/OPTIC: CPT

## 2023-07-13 RX ORDER — METHOCARBAMOL 500 MG/1
500 TABLET, FILM COATED ORAL 2 TIMES DAILY PRN
Qty: 20 TABLET | Refills: 0 | Status: SHIPPED | OUTPATIENT
Start: 2023-07-13

## 2023-07-13 RX ORDER — KETOROLAC TROMETHAMINE 30 MG/ML
30 INJECTION, SOLUTION INTRAMUSCULAR; INTRAVENOUS ONCE
Status: COMPLETED | OUTPATIENT
Start: 2023-07-13 | End: 2023-07-13

## 2023-07-13 RX ADMIN — KETOROLAC TROMETHAMINE 30 MG: 30 INJECTION, SOLUTION INTRAMUSCULAR; INTRAVENOUS at 11:10

## 2023-07-13 NOTE — PATIENT INSTRUCTIONS
Your rapid strep test was negative. No antibiotics are needed at this time. Throat swab will be sent for definitive culture. You can download [employer] for the results which take approximately 48-72 hours. You will be notified if positive. Your chest-ray was read by the provider. A radiologist will also read the x-ray and you will be notified of any abnormalities. You have been given a Toradol shot today. This is an anti-inflammatory medication, similar to ibuprofen. Do not take any other NSAIDs for the next 8 hours. (EX: ibuprofen, Aleve, Motrin). You may take Tylenol and use over the counter Lidocaine patches. You should alternate heating pad and ice applications. Take Robaxin as prescribed. Do not drive or operate heavy machinery after taking this medication as it will cause drowsiness. Do not drink alcohol while taking this medication. Avoid heavy lifting. If you develop any new or worsening symptoms such as fever, vomiting, increased pain, lower extremity weakness, or loss of bowel/bladder control, go to ER for further evaluation.

## 2023-07-13 NOTE — PROGRESS NOTES
[facility]        NAME: Mike Mcnair is a 57 y.o. female  : 1975    MRN: [id number]  DATE: 2023  TIME: 12:19 PM    Assessment and Plan   Acute midline low back pain without sciatica [M54.50]  1. Acute midline low back pain without sciatica        2. Sore throat  POCT rapid strepA    Throat culture      3. Upper back pain  XR chest pa & lateral    ketorolac (TORADOL) injection 30 mg    methocarbamol (ROBAXIN) 500 mg tablet          Patient Instructions     Your rapid strep test was negative. No antibiotics are needed at this time. Throat swab will be sent for definitive culture. You can download [employer] for the results which take approximately 48-72 hours. You will be notified if positive. Your chest-ray was read by the provider. A radiologist will also read the x-ray and you will be notified of any abnormalities. You have been given a Toradol shot today. This is an anti-inflammatory medication, similar to ibuprofen. Do not take any other NSAIDs for the next 8 hours. (EX: ibuprofen, Aleve, Motrin). You may take Tylenol and use over the counter Lidocaine patches. You should alternate heating pad and ice applications. Take Robaxin as prescribed. Do not drive or operate heavy machinery after taking this medication as it will cause drowsiness. Do not drink alcohol while taking this medication. Avoid heavy lifting. If you develop any new or worsening symptoms such as fever, vomiting, increased pain, lower extremity weakness, or loss of bowel/bladder control, go to ER for further evaluation. Chief Complaint     Chief Complaint   Patient presents with   • Back Pain     Patient states that yesterday she started to feel low back pain that wraps around front of body up into the chest area, and wraps around neck area. Patient wants to be checked to see if she has pneumonia.           History of Present Illness       This is a 55yo female who presents with midline low back pain that radiates around her waist to the front of her body and up into her throat. Reports onset of low back pain yesterday described as a "weird pulling" feeling. She denies any sharp shooting pains. The discomfort occurs intermittently, states she felt fine last night at home however her pain was exacerbated while working today. 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 Notes recent back pain associated with work-related injury and she is not sure if this is related. Her back and throat pain are both reproducible with movement such as bending, twisting, talking, laughing, and eating. She denies HA, fevers/chills, URI s/s, CP/ SOB, and N/V/D. She is requesting RST and CXR today as her coworkers told her they think she has walking PNA. Review of Systems   Review of Systems   Constitutional: Negative for chills and fever. HENT: Positive for sore throat. Negative for congestion and rhinorrhea. Respiratory: Positive for cough. Negative for chest tightness and shortness of breath. Cardiovascular: Negative for chest pain and palpitations. Gastrointestinal: Negative for abdominal pain, diarrhea, nausea and vomiting. Musculoskeletal: Positive for back pain. Negative for myalgias and neck pain. Skin: Negative for rash. Neurological: Negative for dizziness, light-headedness and headaches.        Current Medications       Current Outpatient Medications:   •  methocarbamol (ROBAXIN) 500 mg tablet, Take 1 tablet (500 mg total) by mouth 2 (two) times a day as needed for muscle spasms, Disp: 20 tablet, Rfl: 0  •  amLODIPine (NORVASC) 5 mg tablet, Take 1 tablet (5 mg total) by mouth daily, Disp: 90 tablet, Rfl: 1  •  Ascorbic Acid (VITAMIN C PO), Take by mouth, Disp: , Rfl:   •  aspirin 81 MG tablet, Take 81 mg by mouth, Disp: , Rfl:   •  hydrochlorothiazide (HYDRODIURIL) 25 mg tablet, Take 1 tablet (25 mg total) by mouth daily, Disp: 90 tablet, Rfl: 1  •  losartan (COZAAR) 100 MG tablet, Take 1 tablet (100 mg total) by mouth daily, Disp: 90 tablet, Rfl: 1  •  Multiple Vitamins-Minerals (AIRBORNE PO), Take by mouth, Disp: , Rfl:   •  SUMAtriptan (Imitrex) 100 mg tablet, Take 0.5 tablets as needed for headache. Can repeat after 2 hours if headache does not resolve, please do not take more than 2 doses in 24 hours. (Patient not taking: Reported on 3/7/2023), Disp: 9 tablet, Rfl: 3    Current Facility-Administered Medications:   •  ketorolac (TORADOL) injection 30 mg, 30 mg, Intramuscular, Once, PATRICE Suárez    Current Allergies     Allergies as of 07/13/2023   • (No Known Allergies)            The following portions of the patient's history were reviewed and updated as appropriate: allergies, current medications, past family history, past medical history, past social history, past surgical history and problem list.     Past Medical History:   Diagnosis Date   • Fibroids    • Hypertension    • Migraine        History reviewed. No pertinent surgical history. Family History   Problem Relation Age of Onset   • Hypertension Mother    • Prostate cancer Father          Medications have been verified. Objective   /60   Pulse 74   Temp 98.5 °F (36.9 °C) (Tympanic)   Resp 18   SpO2 94%        Physical Exam     Physical Exam  Vitals and nursing note reviewed. Constitutional:       General: She is not in acute distress. Appearance: She is not ill-appearing or diaphoretic. HENT:      Head: Normocephalic. Right Ear: External ear normal.      Left Ear: External ear normal.      Nose: Nose normal.      Mouth/Throat:      Mouth: Mucous membranes are moist.      Pharynx: Posterior oropharyngeal erythema present. No oropharyngeal exudate. Eyes:      Conjunctiva/sclera: Conjunctivae normal.   Cardiovascular:      Rate and Rhythm: Normal rate and regular rhythm. Pulses: Normal pulses. Heart sounds: Normal heart sounds. Pulmonary:      Effort: Pulmonary effort is normal.      Breath sounds: Normal breath sounds. Musculoskeletal:         General: Normal range of motion. Cervical back: Normal and neck supple. No bony tenderness. No pain with movement. Normal range of motion. Thoracic back: Normal. No bony tenderness. Normal range of motion. Lumbar back: Tenderness (b/l paraspinals) present. No bony tenderness. Normal range of motion. Negative right straight leg raise test and negative left straight leg raise test.   Lymphadenopathy:      Cervical: No cervical adenopathy. Skin:     General: Skin is warm and dry. Capillary Refill: Capillary refill takes less than 2 seconds. Neurological:      Mental Status: She is alert and oriented to person, place, and time.       Gait: Gait normal.   Psychiatric:         Mood and Affect: Mood normal.         Behavior: Behavior normal.

## 2023-07-13 NOTE — LETTER
July 13, 2023     Patient: Edmundo White   YOB: 1975   Date of Visit: 7/13/2023       To Whom it May Concern:    Edmundo White was seen in my clinic on 7/13/2023. Please excuse from work on 7/13/23. If you have any questions or concerns, please don't hesitate to call.          Sincerely,          PATRICE Reid        CC: No Recipients

## 2023-07-15 LAB — BACTERIA THROAT CULT: NORMAL

## 2023-08-04 ENCOUNTER — OFFICE VISIT (OUTPATIENT)
Dept: FAMILY MEDICINE CLINIC | Facility: CLINIC | Age: 48
End: 2023-08-04
Payer: MEDICARE

## 2023-08-04 VITALS
SYSTOLIC BLOOD PRESSURE: 146 MMHG | BODY MASS INDEX: 31.22 KG/M2 | WEIGHT: 187.4 LBS | DIASTOLIC BLOOD PRESSURE: 84 MMHG | HEIGHT: 65 IN | OXYGEN SATURATION: 98 % | TEMPERATURE: 100.5 F | HEART RATE: 98 BPM

## 2023-08-04 DIAGNOSIS — J40 BRONCHITIS: Primary | ICD-10-CM

## 2023-08-04 DIAGNOSIS — H10.33 ACUTE CONJUNCTIVITIS OF BOTH EYES, UNSPECIFIED ACUTE CONJUNCTIVITIS TYPE: ICD-10-CM

## 2023-08-04 PROCEDURE — 99213 OFFICE O/P EST LOW 20 MIN: CPT | Performed by: FAMILY MEDICINE

## 2023-08-04 RX ORDER — METHYLPREDNISOLONE 4 MG/1
TABLET ORAL
Qty: 21 EACH | Refills: 0 | Status: SHIPPED | OUTPATIENT
Start: 2023-08-04

## 2023-08-04 RX ORDER — NEOMYCIN POLYMYXIN B SULFATES AND DEXAMETHASONE 3.5; 10000; 1 MG/ML; [USP'U]/ML; MG/ML
1 SUSPENSION/ DROPS OPHTHALMIC 4 TIMES DAILY
Qty: 5 ML | Refills: 0 | Status: SHIPPED | OUTPATIENT
Start: 2023-08-04

## 2023-08-04 RX ORDER — AZITHROMYCIN 250 MG/1
TABLET, FILM COATED ORAL
Qty: 6 TABLET | Refills: 0 | Status: SHIPPED | OUTPATIENT
Start: 2023-08-04 | End: 2023-08-09

## 2023-08-04 NOTE — PROGRESS NOTES
Name: Socorro Falk      : 1975      MRN: [id number]  Encounter Provider: Maxim   Encounter Date: 2023   Encounter department: [facility]    Assessment & Plan   BMI Counseling: Body mass index is 31.18 kg/m². The BMI is above normal. Nutrition recommendations include reducing portion sizes and consuming healthier snacks. Chief Complaint   Patient presents with   • Cough   • Eye Problem     1. Bronchitis  -     azithromycin (Zithromax) 250 mg tablet; Take 2 tablets (500 mg total) by mouth daily for 1 day, THEN 1 tablet (250 mg total) daily for 4 days. -     methylPREDNISolone 4 MG tablet therapy pack; Use as directed on package    2. Acute conjunctivitis of both eyes, unspecified acute conjunctivitis type  -     neomycin-polymyxin-dexamethasone (MAXITROL) 0.1 % ophthalmic suspension; Administer 1 drop to both eyes 4 (four) times a day           Subjective     Chest, yellow production, cold past week, coughing, throat getting sore. Eyes started getting red yesterday, L > R. Review of Systems   Constitutional: Negative. HENT: Negative. Eyes: Negative. Respiratory: Positive for cough. Cardiovascular: Negative. Gastrointestinal: Negative. Genitourinary: Negative. Musculoskeletal: Negative. Skin: Negative. Neurological: Negative. Psychiatric/Behavioral: Negative. Past Medical History:   Diagnosis Date   • Fibroids    • Hypertension    • Migraine      History reviewed. No pertinent surgical history.   Family History   Problem Relation Age of Onset   • Hypertension Mother    • Prostate cancer Father      Social History     Socioeconomic History   • Marital status: /Civil Union     Spouse name: None   • Number of children: None   • Years of education: None   • Highest education level: None   Occupational History     Employer: [employer]   Tobacco Use   • Smoking status: Never   • Smokeless tobacco: Never   Substance and Sexual Activity   • Alcohol use: Yes     Comment: occassional    • Drug use: Not Currently   • Sexual activity: None   Other Topics Concern   • None   Social History Narrative   • None     Social Determinants of Health     Financial Resource Strain: Not on file   Food Insecurity: Not on file   Transportation Needs: Not on file   Physical Activity: Not on file   Stress: Not on file   Social Connections: Not on file   Intimate Partner Violence: Not on file   Housing Stability: Not on file     Current Outpatient Medications on File Prior to Visit   Medication Sig   • amLODIPine (NORVASC) 5 mg tablet Take 1 tablet (5 mg total) by mouth daily   • Ascorbic Acid (VITAMIN C PO) Take by mouth   • aspirin 81 MG tablet Take 81 mg by mouth   • hydrochlorothiazide (HYDRODIURIL) 25 mg tablet Take 1 tablet (25 mg total) by mouth daily   • losartan (COZAAR) 100 MG tablet Take 1 tablet (100 mg total) by mouth daily   • Multiple Vitamins-Minerals (AIRBORNE PO) Take by mouth   • methocarbamol (ROBAXIN) 500 mg tablet Take 1 tablet (500 mg total) by mouth 2 (two) times a day as needed for muscle spasms (Patient not taking: Reported on 8/4/2023)   • SUMAtriptan (Imitrex) 100 mg tablet Take 0.5 tablets as needed for headache. Can repeat after 2 hours if headache does not resolve, please do not take more than 2 doses in 24 hours. (Patient not taking: Reported on 3/7/2023)     No Known Allergies  Immunization History   Administered Date(s) Administered   • COVID-19 MODERNA VACC 0.5 ML IM 10/20/2021, 11/17/2021       Objective     /84 (BP Location: Left arm, Patient Position: Sitting, Cuff Size: Large)   Pulse 98   Temp 100.5 °F (38.1 °C) (Oral)   Ht 5' 5" (1.651 m)   Wt 85 kg (187 lb 6.4 oz)   SpO2 98%   BMI 31.18 kg/m²     Physical Exam  Constitutional:       Appearance: She is well-developed. HENT:      Head: Normocephalic and atraumatic.       Right Ear: External ear normal.      Left Ear: External ear normal.      Nose: Nose normal.      Mouth/Throat:      Mouth: Mucous membranes are moist.      Pharynx: Oropharynx is clear. Eyes:      Pupils: Pupils are equal, round, and reactive to light. Comments: Injected conjunctiva, L > R.   Cardiovascular:      Rate and Rhythm: Normal rate and regular rhythm. Heart sounds: Normal heart sounds. Pulmonary:      Effort: Pulmonary effort is normal.      Breath sounds: Normal breath sounds. Comments: Bronchial cough. Musculoskeletal:      Cervical back: Normal range of motion and neck supple. Skin:     General: Skin is warm and dry. Neurological:      Mental Status: She is alert and oriented to person, place, and time. Deep Tendon Reflexes: Reflexes are normal and symmetric. Psychiatric:         Behavior: Behavior normal.         Thought Content:  Thought content normal.         Judgment: Judgment normal.       Jeanette Khan, DO

## 2023-10-27 NOTE — TELEPHONE ENCOUNTER
Per pharmacy, Losartan-HCTZ on backorder and so is Neomycin-Polymyxin  Alternative to Neomycin is Tobradex  Per Dr Jayy Kevin patient can have script for regular Losartan 100 mg and Tobradex with same instructions  Patient was notified  No anesthesia complications.

## 2023-11-18 DIAGNOSIS — I10 ESSENTIAL HYPERTENSION: ICD-10-CM

## 2023-11-20 RX ORDER — AMLODIPINE BESYLATE 5 MG/1
5 TABLET ORAL DAILY
Qty: 90 TABLET | Refills: 1 | Status: SHIPPED | OUTPATIENT
Start: 2023-11-20

## 2023-11-20 RX ORDER — LOSARTAN POTASSIUM 100 MG/1
100 TABLET ORAL DAILY
Qty: 90 TABLET | Refills: 1 | Status: SHIPPED | OUTPATIENT
Start: 2023-11-20

## 2023-11-20 RX ORDER — HYDROCHLOROTHIAZIDE 25 MG/1
25 TABLET ORAL DAILY
Qty: 90 TABLET | Refills: 1 | Status: SHIPPED | OUTPATIENT
Start: 2023-11-20

## 2023-12-06 ENCOUNTER — CLINICAL SUPPORT (OUTPATIENT)
Dept: FAMILY MEDICINE CLINIC | Facility: CLINIC | Age: 48
End: 2023-12-06
Payer: COMMERCIAL

## 2023-12-06 ENCOUNTER — TELEPHONE (OUTPATIENT)
Dept: FAMILY MEDICINE CLINIC | Facility: CLINIC | Age: 48
End: 2023-12-06

## 2023-12-06 DIAGNOSIS — R05.9 COUGH, UNSPECIFIED TYPE: Primary | ICD-10-CM

## 2023-12-06 LAB
SARS-COV-2 AG UPPER RESP QL IA: POSITIVE
VALID CONTROL: ABNORMAL

## 2023-12-06 PROCEDURE — 87811 SARS-COV-2 COVID19 W/OPTIC: CPT

## 2023-12-06 NOTE — TELEPHONE ENCOUNTER
Patient left voicemail requesting for a phone call.  Left message to call back need to know what patient needs

## 2023-12-27 ENCOUNTER — OFFICE VISIT (OUTPATIENT)
Dept: FAMILY MEDICINE CLINIC | Facility: CLINIC | Age: 48
End: 2023-12-27
Payer: COMMERCIAL

## 2023-12-27 VITALS
HEART RATE: 87 BPM | RESPIRATION RATE: 18 BRPM | DIASTOLIC BLOOD PRESSURE: 88 MMHG | BODY MASS INDEX: 32.52 KG/M2 | WEIGHT: 195.2 LBS | TEMPERATURE: 98.8 F | HEIGHT: 65 IN | OXYGEN SATURATION: 100 % | SYSTOLIC BLOOD PRESSURE: 138 MMHG

## 2023-12-27 DIAGNOSIS — J40 BRONCHITIS: ICD-10-CM

## 2023-12-27 DIAGNOSIS — R05.8 POST-VIRAL COUGH SYNDROME: ICD-10-CM

## 2023-12-27 DIAGNOSIS — R06.2 WHEEZING: Primary | ICD-10-CM

## 2023-12-27 PROCEDURE — 99213 OFFICE O/P EST LOW 20 MIN: CPT | Performed by: FAMILY MEDICINE

## 2023-12-27 RX ORDER — AZITHROMYCIN 250 MG/1
TABLET, FILM COATED ORAL DAILY
Qty: 6 TABLET | Refills: 0 | Status: SHIPPED | OUTPATIENT
Start: 2023-12-27 | End: 2024-01-01

## 2023-12-27 RX ORDER — METHYLPREDNISOLONE 4 MG/1
TABLET ORAL
Qty: 21 EACH | Refills: 0 | Status: SHIPPED | OUTPATIENT
Start: 2023-12-27

## 2023-12-27 NOTE — PROGRESS NOTES
Name: Agnes Escobedo      : 1975      MRN: [id number]  Encounter Provider: Delta Vidal DO  Encounter Date: 2023   Encounter department: [facility]    Assessment & Plan   BMI Counseling: Body mass index is 32.48 kg/m². The BMI is above normal. Nutrition recommendations include reducing portion sizes, consuming healthier snacks, and moderation in carbohydrate intake.  Chief Complaint   Patient presents with    Cough     Follow up, tested covid positive 23      1. Wheezing  -     methylPREDNISolone 4 MG tablet therapy pack; Use as directed on package    2. Bronchitis  -     azithromycin (Zithromax) 250 mg tablet; Take 2 tablets (500 mg total) by mouth daily for 1 day, THEN 1 tablet (250 mg total) daily for 4 days.    3. Post-viral cough syndrome           Subjective     Cough since having covid early December.  Bronchial cough.  Agnes is Nicanor's sister.      Review of Systems   Constitutional: Negative.    HENT: Negative.     Eyes: Negative.    Respiratory:  Positive for cough and wheezing.    Cardiovascular: Negative.    Gastrointestinal: Negative.    Genitourinary: Negative.    Musculoskeletal: Negative.    Skin: Negative.    Neurological: Negative.    Psychiatric/Behavioral: Negative.         Past Medical History:   Diagnosis Date    Fibroids     Hypertension     Migraine      No past surgical history on file.  Family History   Problem Relation Age of Onset    Hypertension Mother     Prostate cancer Father      Social History     Socioeconomic History    Marital status: /Civil Union     Spouse name: None    Number of children: None    Years of education: None    Highest education level: None   Occupational History     Employer: [employer]   Tobacco Use    Smoking status: Never    Smokeless tobacco: Never   Substance and Sexual Activity    Alcohol use: Yes     Comment: occassional     Drug use: Not Currently    Sexual activity: None   Other Topics  "Concern    None   Social History Narrative    None     Social Determinants of Health     Financial Resource Strain: Not on file   Food Insecurity: Not on file   Transportation Needs: Not on file   Physical Activity: Not on file   Stress: Not on file   Social Connections: Not on file   Intimate Partner Violence: Not on file   Housing Stability: Not on file     Current Outpatient Medications on File Prior to Visit   Medication Sig    amLODIPine (NORVASC) 5 mg tablet take 1 tablet by mouth daily    Ascorbic Acid (VITAMIN C PO) Take by mouth    aspirin 81 MG tablet Take 81 mg by mouth    hydrochlorothiazide (HYDRODIURIL) 25 mg tablet take 1 tablet by mouth daily    losartan (COZAAR) 100 MG tablet take 1 tablet by mouth daily    Multiple Vitamins-Minerals (AIRBORNE PO) Take by mouth    methocarbamol (ROBAXIN) 500 mg tablet Take 1 tablet (500 mg total) by mouth 2 (two) times a day as needed for muscle spasms (Patient not taking: Reported on 8/4/2023)    neomycin-polymyxin-dexamethasone (MAXITROL) 0.1 % ophthalmic suspension Administer 1 drop to both eyes 4 (four) times a day    SUMAtriptan (Imitrex) 100 mg tablet Take 0.5 tablets as needed for headache. Can repeat after 2 hours if headache does not resolve, please do not take more than 2 doses in 24 hours. (Patient not taking: Reported on 3/7/2023)    [DISCONTINUED] methylPREDNISolone 4 MG tablet therapy pack Use as directed on package     No Known Allergies  Immunization History   Administered Date(s) Administered    COVID-19 MODERNA VACC 0.5 ML IM 10/20/2021, 11/17/2021       Objective     /88 (BP Location: Left arm, Patient Position: Sitting, Cuff Size: Large)   Pulse 87   Temp 98.8 °F (37.1 °C) (Oral)   Resp 18   Ht 5' 5\" (1.651 m)   Wt 88.5 kg (195 lb 3.2 oz)   SpO2 100%   BMI 32.48 kg/m²     Physical Exam  Constitutional:       Appearance: She is well-developed.   HENT:      Head: Normocephalic and atraumatic.      Right Ear: Tympanic membrane, ear " canal and external ear normal.      Left Ear: Tympanic membrane, ear canal and external ear normal.      Nose: Nose normal.      Mouth/Throat:      Mouth: Mucous membranes are moist.      Pharynx: Oropharynx is clear.   Eyes:      Conjunctiva/sclera: Conjunctivae normal.      Pupils: Pupils are equal, round, and reactive to light.   Cardiovascular:      Rate and Rhythm: Normal rate and regular rhythm.      Heart sounds: Normal heart sounds.   Pulmonary:      Effort: Pulmonary effort is normal.      Breath sounds: Normal breath sounds.   Abdominal:      Palpations: Abdomen is soft.   Musculoskeletal:      Cervical back: Normal range of motion and neck supple.   Skin:     General: Skin is warm and dry.   Neurological:      Mental Status: She is alert and oriented to person, place, and time.      Deep Tendon Reflexes: Reflexes are normal and symmetric.   Psychiatric:         Behavior: Behavior normal.         Thought Content: Thought content normal.         Judgment: Judgment normal.       Delta Vidal,

## 2024-01-03 ENCOUNTER — OFFICE VISIT (OUTPATIENT)
Dept: FAMILY MEDICINE CLINIC | Facility: CLINIC | Age: 49
End: 2024-01-03
Payer: COMMERCIAL

## 2024-01-03 VITALS
WEIGHT: 190.8 LBS | RESPIRATION RATE: 18 BRPM | OXYGEN SATURATION: 98 % | TEMPERATURE: 98.6 F | HEART RATE: 70 BPM | BODY MASS INDEX: 31.79 KG/M2 | DIASTOLIC BLOOD PRESSURE: 84 MMHG | SYSTOLIC BLOOD PRESSURE: 148 MMHG | HEIGHT: 65 IN

## 2024-01-03 DIAGNOSIS — R51.9 CHRONIC NONINTRACTABLE HEADACHE, UNSPECIFIED HEADACHE TYPE: Primary | ICD-10-CM

## 2024-01-03 DIAGNOSIS — G89.29 CHRONIC NONINTRACTABLE HEADACHE, UNSPECIFIED HEADACHE TYPE: Primary | ICD-10-CM

## 2024-01-03 PROCEDURE — 99213 OFFICE O/P EST LOW 20 MIN: CPT | Performed by: FAMILY MEDICINE

## 2024-01-03 NOTE — PROGRESS NOTES
"Name: Agnes Escobedo      : 1975      MRN: [id number]  Encounter Provider: Delta Vidal DO  Encounter Date: 1/3/2024   Encounter department: [facility]    Assessment & Plan     F/U with Neurology.        BMI Counseling: Body mass index is 31.75 kg/m². The BMI is above normal. Nutrition recommendations include reducing portion sizes and consuming healthier snacks.  Chief Complaint   Patient presents with    Headache     Started a day ago, pain is located on the back of head. Feels like \" water dripping on the back of my head\". Denies any lightheadedness, visual disturbances.  Feeling nauseated.      1. Chronic nonintractable headache, unspecified headache type  -     Ambulatory Referral to Neurology; Future           Subjective     Weird feeling top[ back of head.  No HA's.  Seen by Neuro and Neuro surgery.  Never tried the Med's Rx'ed.      Review of Systems   Constitutional: Negative.    HENT: Negative.     Eyes: Negative.    Respiratory: Negative.     Cardiovascular: Negative.    Gastrointestinal: Negative.    Genitourinary:  Positive for frequency.   Musculoskeletal: Negative.    Skin: Negative.    Neurological: Negative.  Negative for headaches.   Psychiatric/Behavioral: Negative.         Past Medical History:   Diagnosis Date    Fibroids     Hypertension     Migraine      History reviewed. No pertinent surgical history.  Family History   Problem Relation Age of Onset    Hypertension Mother     Prostate cancer Father      Social History     Socioeconomic History    Marital status: /Civil Union     Spouse name: None    Number of children: None    Years of education: None    Highest education level: None   Occupational History     Employer: [employer]   Tobacco Use    Smoking status: Never    Smokeless tobacco: Never   Vaping Use    Vaping status: Never Used   Substance and Sexual Activity    Alcohol use: Yes     Comment: occassional     Drug use: Not Currently    " "Sexual activity: None   Other Topics Concern    None   Social History Narrative    None     Social Determinants of Health     Financial Resource Strain: Not on file   Food Insecurity: Not on file   Transportation Needs: Not on file   Physical Activity: Not on file   Stress: Not on file   Social Connections: Not on file   Intimate Partner Violence: Not on file   Housing Stability: Not on file     Current Outpatient Medications on File Prior to Visit   Medication Sig    amLODIPine (NORVASC) 5 mg tablet take 1 tablet by mouth daily    Ascorbic Acid (VITAMIN C PO) Take by mouth    aspirin 81 MG tablet Take 81 mg by mouth    hydrochlorothiazide (HYDRODIURIL) 25 mg tablet take 1 tablet by mouth daily    losartan (COZAAR) 100 MG tablet take 1 tablet by mouth daily    Multiple Vitamins-Minerals (AIRBORNE PO) Take by mouth    neomycin-polymyxin-dexamethasone (MAXITROL) 0.1 % ophthalmic suspension Administer 1 drop to both eyes 4 (four) times a day    methocarbamol (ROBAXIN) 500 mg tablet Take 1 tablet (500 mg total) by mouth 2 (two) times a day as needed for muscle spasms (Patient not taking: Reported on 8/4/2023)    methylPREDNISolone 4 MG tablet therapy pack Use as directed on package (Patient not taking: Reported on 1/3/2024)    SUMAtriptan (Imitrex) 100 mg tablet Take 0.5 tablets as needed for headache. Can repeat after 2 hours if headache does not resolve, please do not take more than 2 doses in 24 hours. (Patient not taking: Reported on 3/7/2023)     No Known Allergies  Immunization History   Administered Date(s) Administered    COVID-19 MODERNA VACC 0.5 ML IM 10/20/2021, 11/17/2021       Objective     /84 (BP Location: Left arm, Patient Position: Sitting, Cuff Size: Large)   Pulse 70   Temp 98.6 °F (37 °C) (Oral)   Resp 18   Ht 5' 5\" (1.651 m)   Wt 86.5 kg (190 lb 12.8 oz)   SpO2 98%   BMI 31.75 kg/m²     Physical Exam  Delta Vidal, DO    "

## 2024-01-16 ENCOUNTER — TELEPHONE (OUTPATIENT)
Dept: NEUROLOGY | Facility: CLINIC | Age: 49
End: 2024-01-16

## 2024-02-26 DIAGNOSIS — I10 ESSENTIAL HYPERTENSION: ICD-10-CM

## 2024-02-26 RX ORDER — LOSARTAN POTASSIUM 100 MG/1
100 TABLET ORAL DAILY
Qty: 90 TABLET | Refills: 0 | Status: SHIPPED | OUTPATIENT
Start: 2024-02-26

## 2024-02-26 RX ORDER — HYDROCHLOROTHIAZIDE 25 MG/1
25 TABLET ORAL DAILY
Qty: 90 TABLET | Refills: 0 | Status: SHIPPED | OUTPATIENT
Start: 2024-02-26

## 2024-02-26 RX ORDER — AMLODIPINE BESYLATE 5 MG/1
5 TABLET ORAL DAILY
Qty: 90 TABLET | Refills: 0 | Status: SHIPPED | OUTPATIENT
Start: 2024-02-26

## 2024-03-11 NOTE — TELEPHONE ENCOUNTER
Called patient to give instructions from provider, she stated she forgot to mention she does have an open cut in her nose  Instructed patient to make sure she reports incident to her employer/employee health for further evaluation and instructions  She stated she works through an agency, did notify agency and told same as provider suggested, to look for signs and symptoms and have any issues should follow up with employer or call our office back  She stated she understood 
No treatment necessary 
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 
Yes

## 2024-03-20 ENCOUNTER — TELEPHONE (OUTPATIENT)
Dept: FAMILY MEDICINE CLINIC | Facility: CLINIC | Age: 49
End: 2024-03-20

## 2024-04-15 ENCOUNTER — TELEPHONE (OUTPATIENT)
Age: 49
End: 2024-04-15

## 2024-04-15 NOTE — TELEPHONE ENCOUNTER
Pt called and said she needed a physical for work earlier this week because she is not going to have the same insurance and wanted to get it before its done. Pt wants to do a quantiferon but she is not sure if that's the test that her employer will request and wanted to know if she can get the TB first and then come thursday for a physical. Please advise [phone] thank kya.

## 2024-04-16 ENCOUNTER — OFFICE VISIT (OUTPATIENT)
Dept: FAMILY MEDICINE CLINIC | Facility: CLINIC | Age: 49
End: 2024-04-16
Payer: COMMERCIAL

## 2024-04-16 VITALS
HEART RATE: 80 BPM | SYSTOLIC BLOOD PRESSURE: 138 MMHG | DIASTOLIC BLOOD PRESSURE: 78 MMHG | WEIGHT: 192 LBS | BODY MASS INDEX: 31.99 KG/M2 | TEMPERATURE: 98.1 F | HEIGHT: 65 IN

## 2024-04-16 DIAGNOSIS — Z01.84 IMMUNITY STATUS TESTING: ICD-10-CM

## 2024-04-16 DIAGNOSIS — Z00.00 ANNUAL PHYSICAL EXAM: Primary | ICD-10-CM

## 2024-04-16 DIAGNOSIS — S39.012A STRAIN OF LUMBAR REGION, INITIAL ENCOUNTER: ICD-10-CM

## 2024-04-16 DIAGNOSIS — Z13.1 SCREENING FOR DIABETES MELLITUS: ICD-10-CM

## 2024-04-16 DIAGNOSIS — S46.912A STRAIN OF LEFT SHOULDER, INITIAL ENCOUNTER: ICD-10-CM

## 2024-04-16 DIAGNOSIS — I10 ESSENTIAL HYPERTENSION: ICD-10-CM

## 2024-04-16 DIAGNOSIS — Z11.1 SCREENING FOR TUBERCULOSIS: ICD-10-CM

## 2024-04-16 PROCEDURE — 99396 PREV VISIT EST AGE 40-64: CPT | Performed by: FAMILY MEDICINE

## 2024-04-16 RX ORDER — CYCLOBENZAPRINE HCL 10 MG
10 TABLET ORAL
Qty: 30 TABLET | Refills: 0 | Status: SHIPPED | OUTPATIENT
Start: 2024-04-16

## 2024-04-16 NOTE — LETTER
April 16, 2024     Patient: Agnes Escobedo  YOB: 1975  Date of Visit: 4/16/2024      To Whom it May Concern:    Agnes Escobedo is under my professional care. Agnes was seen in my office on 4/16/2024. Agnes may return to work on 04/17 .    If you have any questions or concerns, please don't hesitate to call.         Sincerely,          Jose Mandujano DO        CC: No Recipients

## 2024-04-16 NOTE — PROGRESS NOTES
ADULT ANNUAL PHYSICAL  [facility]    NAME: Agnes Escobedo  AGE: 48 y.o. SEX: female  : 1975     DATE: 2024     Assessment and Plan:     Problem List Items Addressed This Visit        Cardiovascular and Mediastinum    Essential hypertension    Relevant Orders    Lipid panel    Albumin / creatinine urine ratio    Comprehensive metabolic panel    TSH, 3rd generation with Free T4 reflex       Musculoskeletal and Integument    Strain of lumbar region     From work, referral PT. Trial flexeril 10mg QHS         Relevant Medications    cyclobenzaprine (FLEXERIL) 10 mg tablet    Other Relevant Orders    Ambulatory Referral to Physical Therapy    Strain of left shoulder     Inflammatory from work, limited ROM all fields shoulder. Check for DM2         Relevant Orders    Ambulatory Referral to Physical Therapy       Other    Annual physical exam - Primary    Screening for diabetes mellitus    Relevant Orders    Hemoglobin A1C    Screening for tuberculosis    Immunity status testing     Quant GOLD         Relevant Orders    Quantiferon TB Gold Plus Assay       Immunizations and preventive care screenings were discussed with patient today. Appropriate education was printed on patient's after visit summary.    Counseling:  Alcohol/drug use: discussed moderation in alcohol intake, the recommendations for healthy alcohol use, and avoidance of illicit drug use.  Dental Health: discussed importance of regular tooth brushing, flossing, and dental visits.  Injury prevention: discussed safety/seat belts, safety helmets, smoke detectors, carbon dioxide detectors, and smoking near bedding or upholstery.  Sexual health: discussed sexually transmitted diseases, partner selection, use of condoms, avoidance of unintended pregnancy, and contraceptive alternatives.  Exercise: the importance of regular exercise/physical activity was discussed. Recommend exercise 3-5 times per  week for at least 30 minutes.          Return in about 6 months (around 10/16/2024) for Recheck HTN.     Chief Complaint:     Chief Complaint   Patient presents with   • Annual Exam   • Tingling     Left arm      History of Present Illness:     Adult Annual Physical   Patient here for a comprehensive physical exam. The patient reports problems - L shoulder pain and lower back pain .    Diet and Physical Activity  Diet/Nutrition: well balanced diet.   Exercise: no formal exercise.      Depression Screening  PHQ-2/9 Depression Screening         General Health  Sleep: sleeps well.   Hearing: normal - bilateral.  Vision: no vision problems.   Dental: regular dental visits.       /GYN Health  Follows with gynecology? yes   Patient is: premenopausal  Last menstrual period: late feb 2024  Contraceptive method:    .    Advanced Care Planning  Do you have an advanced directive? no  Do you have a durable medical power of ? no  ACP document given to the patient? no     Review of Systems:     Review of Systems   Constitutional:  Negative for chills and fever.   HENT:  Negative for ear pain and sore throat.    Eyes:  Negative for pain and visual disturbance.   Respiratory:  Negative for cough and shortness of breath.    Cardiovascular:  Negative for chest pain and palpitations.   Gastrointestinal:  Negative for abdominal pain and vomiting.   Genitourinary:  Negative for dysuria and hematuria.   Musculoskeletal:  Positive for arthralgias, back pain and myalgias.   Skin:  Negative for color change and rash.   Neurological:  Negative for seizures and syncope.   All other systems reviewed and are negative.     Past Medical History:     Past Medical History:   Diagnosis Date   • Fibroids    • Hypertension    • Migraine       Past Surgical History:     History reviewed. No pertinent surgical history.   Social History:     Social History     Socioeconomic History   • Marital status: Single     Spouse name: None   • Number of  children: None   • Years of education: None   • Highest education level: None   Occupational History     Employer: [employer]   Tobacco Use   • Smoking status: Never   • Smokeless tobacco: Never   Vaping Use   • Vaping status: Never Used   Substance and Sexual Activity   • Alcohol use: Yes     Comment: occassional    • Drug use: Not Currently   • Sexual activity: None   Other Topics Concern   • None   Social History Narrative   • None     Social Determinants of Health     Financial Resource Strain: Not on file   Food Insecurity: Not on file   Transportation Needs: Not on file   Physical Activity: Not on file   Stress: Not on file   Social Connections: Not on file   Intimate Partner Violence: Not on file   Housing Stability: Not on file      Family History:     Family History   Problem Relation Age of Onset   • Hypertension Mother    • Prostate cancer Father       Current Medications:     Current Outpatient Medications   Medication Sig Dispense Refill   • amLODIPine (NORVASC) 5 mg tablet Take 1 tablet (5 mg total) by mouth daily 90 tablet 0   • Ascorbic Acid (VITAMIN C PO) Take by mouth     • aspirin 81 MG tablet Take 81 mg by mouth     • cyclobenzaprine (FLEXERIL) 10 mg tablet Take 1 tablet (10 mg total) by mouth daily at bedtime 30 tablet 0   • hydroCHLOROthiazide 25 mg tablet Take 1 tablet (25 mg total) by mouth daily 90 tablet 0   • losartan (COZAAR) 100 MG tablet Take 1 tablet (100 mg total) by mouth daily 90 tablet 0   • methylPREDNISolone 4 MG tablet therapy pack Use as directed on package 21 each 0   • Multiple Vitamins-Minerals (AIRBORNE PO) Take by mouth     • neomycin-polymyxin-dexamethasone (MAXITROL) 0.1 % ophthalmic suspension Administer 1 drop to both eyes 4 (four) times a day 5 mL 0   • SUMAtriptan (Imitrex) 100 mg tablet Take 0.5 tablets as needed for headache. Can repeat after 2 hours if headache does not resolve, please do not take more than 2 doses in 24 hours. 9 tablet 3     No  "current facility-administered medications for this visit.      Allergies:     No Known Allergies   Physical Exam:     /78   Pulse 80   Temp 98.1 °F (36.7 °C) (Temporal)   Ht 5' 5\" (1.651 m)   Wt 87.1 kg (192 lb)   BMI 31.95 kg/m²     Physical Exam  Vitals reviewed.   Constitutional:       General: She is not in acute distress.     Appearance: Normal appearance. She is not ill-appearing.   HENT:      Head: Normocephalic and atraumatic.      Nose: Nose normal. No congestion or rhinorrhea.      Mouth/Throat:      Mouth: Mucous membranes are dry.      Pharynx: Oropharynx is clear. No oropharyngeal exudate or posterior oropharyngeal erythema.   Eyes:      Extraocular Movements: Extraocular movements intact.      Conjunctiva/sclera: Conjunctivae normal.      Pupils: Pupils are equal, round, and reactive to light.   Cardiovascular:      Rate and Rhythm: Normal rate and regular rhythm.      Pulses: Normal pulses.      Heart sounds: Normal heart sounds. No murmur heard.  Pulmonary:      Effort: Pulmonary effort is normal.      Breath sounds: Normal breath sounds. No wheezing.   Abdominal:      General: Bowel sounds are normal.      Palpations: Abdomen is soft.      Tenderness: There is no abdominal tenderness.   Musculoskeletal:         General: Tenderness present.      Comments: Lower lumbar area and L shoulder. Limited ROM of L shoulder.   Neurological:      Mental Status: She is alert.   Psychiatric:         Mood and Affect: Mood normal.         Behavior: Behavior normal.         Thought Content: Thought content normal.         Judgment: Judgment normal.          DO SHARITA Waterman [facility]    "

## 2024-04-18 ENCOUNTER — APPOINTMENT (OUTPATIENT)
Dept: LAB | Facility: CLINIC | Age: 49
End: 2024-04-18
Payer: COMMERCIAL

## 2024-04-18 DIAGNOSIS — I10 ESSENTIAL HYPERTENSION: ICD-10-CM

## 2024-04-18 DIAGNOSIS — Z01.84 IMMUNITY STATUS TESTING: ICD-10-CM

## 2024-04-18 DIAGNOSIS — Z13.1 SCREENING FOR DIABETES MELLITUS: ICD-10-CM

## 2024-04-18 LAB
ALBUMIN SERPL BCP-MCNC: 4.1 G/DL (ref 3.5–5)
ALP SERPL-CCNC: 50 U/L (ref 34–104)
ALT SERPL W P-5'-P-CCNC: 19 U/L (ref 7–52)
ANION GAP SERPL CALCULATED.3IONS-SCNC: 11 MMOL/L (ref 4–13)
AST SERPL W P-5'-P-CCNC: 24 U/L (ref 13–39)
BILIRUB SERPL-MCNC: 0.66 MG/DL (ref 0.2–1)
BUN SERPL-MCNC: 14 MG/DL (ref 5–25)
CALCIUM SERPL-MCNC: 9.3 MG/DL (ref 8.4–10.2)
CHLORIDE SERPL-SCNC: 101 MMOL/L (ref 96–108)
CHOLEST SERPL-MCNC: 171 MG/DL
CO2 SERPL-SCNC: 27 MMOL/L (ref 21–32)
CREAT SERPL-MCNC: 0.81 MG/DL (ref 0.6–1.3)
CREAT UR-MCNC: 134.3 MG/DL
GFR SERPL CREATININE-BSD FRML MDRD: 87 ML/MIN/1.73SQ M
GLUCOSE P FAST SERPL-MCNC: 87 MG/DL (ref 65–99)
HDLC SERPL-MCNC: 64 MG/DL
LDLC SERPL CALC-MCNC: 90 MG/DL (ref 0–100)
MICROALBUMIN UR-MCNC: <7 MG/L
MICROALBUMIN/CREAT 24H UR: <5 MG/G CREATININE (ref 0–30)
NONHDLC SERPL-MCNC: 107 MG/DL
POTASSIUM SERPL-SCNC: 3.3 MMOL/L (ref 3.5–5.3)
PROT SERPL-MCNC: 7.5 G/DL (ref 6.4–8.4)
SODIUM SERPL-SCNC: 139 MMOL/L (ref 135–147)
TRIGL SERPL-MCNC: 86 MG/DL

## 2024-04-18 PROCEDURE — 80061 LIPID PANEL: CPT

## 2024-04-18 PROCEDURE — 80053 COMPREHEN METABOLIC PANEL: CPT

## 2024-04-18 PROCEDURE — 86480 TB TEST CELL IMMUN MEASURE: CPT

## 2024-04-18 PROCEDURE — 83036 HEMOGLOBIN GLYCOSYLATED A1C: CPT

## 2024-04-18 PROCEDURE — 82043 UR ALBUMIN QUANTITATIVE: CPT

## 2024-04-18 PROCEDURE — 36415 COLL VENOUS BLD VENIPUNCTURE: CPT

## 2024-04-18 PROCEDURE — 84443 ASSAY THYROID STIM HORMONE: CPT

## 2024-04-18 PROCEDURE — 82570 ASSAY OF URINE CREATININE: CPT

## 2024-04-19 LAB
GAMMA INTERFERON BACKGROUND BLD IA-ACNC: 0.09 IU/ML
M TB IFN-G BLD-IMP: NEGATIVE
M TB IFN-G CD4+ BCKGRND COR BLD-ACNC: 0.02 IU/ML
M TB IFN-G CD4+ BCKGRND COR BLD-ACNC: 0.04 IU/ML
MITOGEN IGNF BCKGRD COR BLD-ACNC: 9.91 IU/ML
TSH SERPL DL<=0.05 MIU/L-ACNC: 1.63 UIU/ML (ref 0.45–4.5)

## 2024-04-20 LAB
EST. AVERAGE GLUCOSE BLD GHB EST-MCNC: 126 MG/DL
HBA1C MFR BLD: 6 %

## 2024-04-23 DIAGNOSIS — I10 ESSENTIAL HYPERTENSION: ICD-10-CM

## 2024-04-23 RX ORDER — AMLODIPINE BESYLATE 5 MG/1
5 TABLET ORAL DAILY
Qty: 90 TABLET | Refills: 1 | Status: SHIPPED | OUTPATIENT
Start: 2024-04-23

## 2024-04-23 RX ORDER — HYDROCHLOROTHIAZIDE 25 MG/1
25 TABLET ORAL DAILY
Qty: 90 TABLET | Refills: 1 | Status: SHIPPED | OUTPATIENT
Start: 2024-04-23

## 2024-04-23 RX ORDER — LOSARTAN POTASSIUM 100 MG/1
100 TABLET ORAL DAILY
Qty: 90 TABLET | Refills: 1 | Status: SHIPPED | OUTPATIENT
Start: 2024-04-23

## 2024-04-25 ENCOUNTER — TELEPHONE (OUTPATIENT)
Dept: FAMILY MEDICINE CLINIC | Facility: CLINIC | Age: 49
End: 2024-04-25

## 2024-04-25 NOTE — TELEPHONE ENCOUNTER
Name of the form - [facility] [employer]    Provider -     To be  by - Patient

## 2024-05-16 PROBLEM — Z11.1 SCREENING FOR TUBERCULOSIS: Status: RESOLVED | Noted: 2024-04-16 | Resolved: 2024-05-16

## 2024-05-16 PROBLEM — Z13.1 SCREENING FOR DIABETES MELLITUS: Status: RESOLVED | Noted: 2024-04-16 | Resolved: 2024-05-16

## 2024-07-03 ENCOUNTER — TELEPHONE (OUTPATIENT)
Age: 49
End: 2024-07-03

## 2024-07-03 NOTE — TELEPHONE ENCOUNTER
Pt called, the letter she received from the provider that she can return to work with no restriction, employer will not accept.  The verbiage  in the letter must be exactly written as:    patient is cleared to work without restrictions.    Please upload revised letter in my chart for visibility.

## 2024-11-11 DIAGNOSIS — I10 ESSENTIAL HYPERTENSION: ICD-10-CM

## 2024-11-13 RX ORDER — AMLODIPINE BESYLATE 5 MG/1
5 TABLET ORAL DAILY
Qty: 90 TABLET | Refills: 1 | Status: SHIPPED | OUTPATIENT
Start: 2024-11-13

## 2024-11-13 RX ORDER — LOSARTAN POTASSIUM 100 MG/1
100 TABLET ORAL DAILY
Qty: 90 TABLET | Refills: 1 | Status: SHIPPED | OUTPATIENT
Start: 2024-11-13

## 2024-12-15 DIAGNOSIS — I10 ESSENTIAL HYPERTENSION: ICD-10-CM

## 2024-12-16 RX ORDER — HYDROCHLOROTHIAZIDE 25 MG/1
25 TABLET ORAL DAILY
Qty: 90 TABLET | Refills: 0 | Status: SHIPPED | OUTPATIENT
Start: 2024-12-16

## 2024-12-24 ENCOUNTER — TELEPHONE (OUTPATIENT)
Age: 49
End: 2024-12-24

## 2024-12-24 NOTE — TELEPHONE ENCOUNTER
Patient called to confirm where she can see her lab [facility] Walked patient through finding these results. Patient also wanted copy of the employment form that was completed in April. Advised patient the chart is marked it was picked up by the patient.

## 2025-02-16 DIAGNOSIS — I10 ESSENTIAL HYPERTENSION: ICD-10-CM

## 2025-02-17 DIAGNOSIS — I10 ESSENTIAL HYPERTENSION: ICD-10-CM

## 2025-02-17 RX ORDER — HYDROCHLOROTHIAZIDE 25 MG/1
25 TABLET ORAL DAILY
Qty: 30 TABLET | Refills: 0 | OUTPATIENT
Start: 2025-02-17

## 2025-02-17 RX ORDER — HYDROCHLOROTHIAZIDE 25 MG/1
25 TABLET ORAL DAILY
Qty: 90 TABLET | Refills: 0 | Status: SHIPPED | OUTPATIENT
Start: 2025-02-17

## 2025-02-17 NOTE — TELEPHONE ENCOUNTER
Reason for call:   [x] Refill  [] Prior Auth  [] Other:     Office:   [x] PCP/Provider - Thony ARAUJO/ Jose Mandujano  [] Specialty/Provider -     Medication: hydroCHLOROthiazide 25 mg tablet     Dose/Frequency:  TAKE ONE TABLET BY MOUTH EVERY DAY    Quantity: 90    Pharmacy: [address] JU[facility] Herman [address]     Does the patient have enough for 3 days?   [] Yes   [x] No - Send as HP to POD-patient states she only has 1 left

## 2025-03-12 DIAGNOSIS — I10 ESSENTIAL HYPERTENSION: ICD-10-CM

## 2025-03-17 RX ORDER — HYDROCHLOROTHIAZIDE 25 MG/1
25 TABLET ORAL DAILY
Qty: 90 TABLET | Refills: 0 | Status: SHIPPED | OUTPATIENT
Start: 2025-03-17

## 2025-04-21 DIAGNOSIS — I10 ESSENTIAL HYPERTENSION: ICD-10-CM

## 2025-04-22 RX ORDER — LOSARTAN POTASSIUM 100 MG/1
100 TABLET ORAL DAILY
Qty: 100 TABLET | Refills: 0 | Status: SHIPPED | OUTPATIENT
Start: 2025-04-22

## 2025-04-22 RX ORDER — AMLODIPINE BESYLATE 5 MG/1
5 TABLET ORAL DAILY
Qty: 100 TABLET | Refills: 0 | Status: SHIPPED | OUTPATIENT
Start: 2025-04-22